# Patient Record
Sex: MALE | Race: WHITE | NOT HISPANIC OR LATINO | Employment: OTHER | URBAN - METROPOLITAN AREA
[De-identification: names, ages, dates, MRNs, and addresses within clinical notes are randomized per-mention and may not be internally consistent; named-entity substitution may affect disease eponyms.]

---

## 2017-08-07 ENCOUNTER — HOSPITAL ENCOUNTER (OUTPATIENT)
Dept: FAMILY MEDICINE CLINIC | Facility: CLINIC | Age: 67
Setting detail: SPECIMEN
Discharge: HOME | End: 2017-08-07
Attending: FAMILY MEDICINE | Admitting: FAMILY MEDICINE

## 2017-08-07 LAB
ALBUMIN SERPL-MCNC: 4.2 G/DL (ref 3.5–4.8)
ALBUMIN/GLOB SERPL: 1.4 {RATIO} (ref 1–1.7)
ALP SERPL-CCNC: 87 IU/L (ref 32–91)
ALT SERPL-CCNC: 24 IU/L (ref 17–63)
ANION GAP SERPL CALC-SCNC: 14.7 MMOL/L (ref 10–20)
AST SERPL-CCNC: 22 IU/L (ref 15–41)
BASOPHILS # BLD AUTO: 0 10*3/UL (ref 0–0.2)
BASOPHILS NFR BLD AUTO: 0 % (ref 0–2)
BILIRUB SERPL-MCNC: 0.6 MG/DL (ref 0.3–1.2)
BUN SERPL-MCNC: 12 MG/DL (ref 8–20)
BUN/CREAT SERPL: 10.9 (ref 6.2–20.3)
CALCIUM SERPL-MCNC: 9.4 MG/DL (ref 8.9–10.3)
CHLORIDE SERPL-SCNC: 107 MMOL/L (ref 101–111)
CHOLEST SERPL-MCNC: 149 MG/DL
CHOLEST/HDLC SERPL: 4 {RATIO}
CONV CO2: 25 MMOL/L (ref 22–32)
CONV LDL CHOLESTEROL DIRECT: 70 MG/DL (ref 0–100)
CONV TOTAL PROTEIN: 7.3 G/DL (ref 6.1–7.9)
CREAT UR-MCNC: 1.1 MG/DL (ref 0.7–1.2)
DIFFERENTIAL METHOD BLD: (no result)
EOSINOPHIL # BLD AUTO: 0.1 10*3/UL (ref 0–0.3)
EOSINOPHIL # BLD AUTO: 2 % (ref 0–3)
ERYTHROCYTE [DISTWIDTH] IN BLOOD BY AUTOMATED COUNT: 13 % (ref 11.5–14.5)
GLOBULIN UR ELPH-MCNC: 3.1 G/DL (ref 2.5–3.8)
GLUCOSE SERPL-MCNC: 105 MG/DL (ref 65–99)
HCT VFR BLD AUTO: 50.5 % (ref 40–54)
HDLC SERPL-MCNC: 37 MG/DL
HGB BLD-MCNC: 17.4 G/DL (ref 14–18)
LDLC/HDLC SERPL: 1.9 {RATIO}
LIPID INTERPRETATION: ABNORMAL
LYMPHOCYTES # BLD AUTO: 1.8 10*3/UL (ref 0.8–4.8)
LYMPHOCYTES NFR BLD AUTO: 30 % (ref 18–42)
MCH RBC QN AUTO: 31.3 PG (ref 26–32)
MCHC RBC AUTO-ENTMCNC: 34.5 G/DL (ref 32–36)
MCV RBC AUTO: 90.6 FL (ref 80–94)
MONOCYTES # BLD AUTO: 0.8 10*3/UL (ref 0.1–1.3)
MONOCYTES NFR BLD AUTO: 13 % (ref 2–11)
NEUTROPHILS # BLD AUTO: 3.2 10*3/UL (ref 2.3–8.6)
NEUTROPHILS NFR BLD AUTO: 55 % (ref 50–75)
NRBC BLD AUTO-RTO: 0 /100{WBCS}
NRBC/RBC NFR BLD MANUAL: 0 10*3/UL
PLATELET # BLD AUTO: 169 10*3/UL (ref 150–450)
PMV BLD AUTO: 10 FL (ref 7.4–10.4)
POTASSIUM SERPL-SCNC: 4.7 MMOL/L (ref 3.6–5.1)
RBC # BLD AUTO: 5.57 10*6/UL (ref 4.6–6)
SODIUM SERPL-SCNC: 142 MMOL/L (ref 136–144)
TRIGL SERPL-MCNC: 260 MG/DL
VLDLC SERPL CALC-MCNC: 42.2 MG/DL
WBC # BLD AUTO: 5.9 10*3/UL (ref 4.5–11.5)

## 2019-08-27 ENCOUNTER — OFFICE VISIT (OUTPATIENT)
Dept: CARDIOLOGY | Facility: CLINIC | Age: 69
End: 2019-08-27

## 2019-08-27 ENCOUNTER — CLINICAL SUPPORT NO REQUIREMENTS (OUTPATIENT)
Dept: CARDIOLOGY | Facility: CLINIC | Age: 69
End: 2019-08-27

## 2019-08-27 VITALS
HEART RATE: 68 BPM | DIASTOLIC BLOOD PRESSURE: 84 MMHG | HEIGHT: 68 IN | WEIGHT: 219 LBS | SYSTOLIC BLOOD PRESSURE: 145 MMHG | BODY MASS INDEX: 33.19 KG/M2

## 2019-08-27 DIAGNOSIS — I49.5 SICK SINUS SYNDROME (HCC): ICD-10-CM

## 2019-08-27 DIAGNOSIS — I25.10 CORONARY ARTERY DISEASE INVOLVING NATIVE CORONARY ARTERY OF NATIVE HEART WITHOUT ANGINA PECTORIS: Primary | ICD-10-CM

## 2019-08-27 DIAGNOSIS — I10 ESSENTIAL HYPERTENSION: ICD-10-CM

## 2019-08-27 DIAGNOSIS — Z95.0 PRESENCE OF CARDIAC PACEMAKER: ICD-10-CM

## 2019-08-27 PROBLEM — I73.9 PERIPHERAL VASCULAR DISEASE: Status: ACTIVE | Noted: 2019-08-27

## 2019-08-27 PROBLEM — E78.5 HYPERLIPIDEMIA: Status: ACTIVE | Noted: 2019-08-27

## 2019-08-27 PROCEDURE — 93279 PRGRMG DEV EVAL PM/LDLS PM: CPT | Performed by: NURSE PRACTITIONER

## 2019-08-27 PROCEDURE — 99213 OFFICE O/P EST LOW 20 MIN: CPT | Performed by: NURSE PRACTITIONER

## 2019-08-27 PROCEDURE — 93000 ELECTROCARDIOGRAM COMPLETE: CPT | Performed by: NURSE PRACTITIONER

## 2019-08-27 RX ORDER — ATORVASTATIN CALCIUM 40 MG/1
1 TABLET, FILM COATED ORAL DAILY
COMMUNITY
Start: 2019-06-04 | End: 2020-05-04

## 2019-08-27 RX ORDER — ATENOLOL 25 MG/1
1 TABLET ORAL DAILY
COMMUNITY
Start: 2019-06-04 | End: 2020-05-04

## 2019-08-27 RX ORDER — AMLODIPINE BESYLATE 5 MG/1
1 TABLET ORAL DAILY
COMMUNITY
Start: 2019-06-04 | End: 2020-05-04

## 2019-08-27 RX ORDER — LISINOPRIL 40 MG/1
1 TABLET ORAL DAILY
COMMUNITY
Start: 2019-06-04 | End: 2019-09-03 | Stop reason: SDUPTHER

## 2019-08-27 RX ORDER — CLOPIDOGREL BISULFATE 75 MG/1
1 TABLET ORAL DAILY
COMMUNITY
Start: 2019-06-04 | End: 2019-09-03 | Stop reason: SDUPTHER

## 2019-08-28 PROBLEM — Z95.0 PRESENCE OF CARDIAC PACEMAKER: Status: ACTIVE | Noted: 2019-08-28

## 2019-08-28 PROBLEM — I10 ESSENTIAL HYPERTENSION: Status: ACTIVE | Noted: 2019-08-28

## 2019-08-28 NOTE — ASSESSMENT & PLAN NOTE
Hypertension is unchanged.  Continue current treatment regimen.  Regular aerobic exercise.  Continue current medications.  Blood pressure will be reassessed at the next regular appointment.

## 2019-08-28 NOTE — PROGRESS NOTES
Cardiology Office Follow Up Visit      Primary Care Provider:  Byron Salazar MD    Reason for f/u:     Sick sinus syndrome  Coronary artery disease  Hypertension  Pacemaker in place      Subjective      Denies chest pain or dyspnea    CC: Follow-up regarding coronary artery disease    History of Present Illness       Aashish Freeman is a 68 y.o. male.  He has a history of advanced ischemic coronary artery disease with previous bypass surgery in 1997.  In July 2018 he had PCI to the LAD.  He was also noted at that time to have occluded OM branch of the left circumflex and occluded RCA.  His vein graft to the RCA was patent.  LAKE was no longer anastomosed to the LAD.  He had PCI to the LAD at that time.       He has a previous history of intermittent AV block with a dual-chamber pacemaker [employer] implanted 2012 he is previously had issues with atrial lead malfunction of the device in a program single-chamber mode with back-up pacing at VVI 55.    At this time he denies chest pain, dyspnea, PND, orthopnea, palpitations, near syncope, lower extreme edema or feelings of his heart racing.  He is been compliant with medical therapy        Past Medical History:   Diagnosis Date   • AK (actinic keratosis)     multiple   • Arthritis    • CAD (coronary artery disease)    • Carotid artery stenosis    • Hyperlipemia    • Hypertension    • Non-ST elevation myocardial infarction (NSTEMI) (CMS/[facility])     2015   • Peripheral vascular disease (CMS/[facility])    • Pre-diabetes    • Pulmonary nodule 2016    left lung   • SSS (sick sinus syndrome) (CMS/[facility])     intermittent AV block       Past Surgical History:   Procedure Laterality Date   • CAROTID ENDARTERECTOMY Right 10/2014   • CORONARY ANGIOPLASTY  2012    svg to obtuse marginal branch of lcx   • CORONARY ANGIOPLASTY WITH STENT PLACEMENT  07/29/2018    pci to mid lad   • CORONARY ARTERY BYPASS GRAFT  1997    5 vessels at [facility]/ Dr Armendariz   • CORONARY STENT PLACEMENT   03/16/2015    stenting of svg graft to obtuse marginal and peripheral descending artery   • FEMORAL POPLITEAL BYPASS  11/2014   • ILIAC ARTERY STENT  12/2014   • PACEMAKER IMPLANTATION  03/29/2012    [employer]         Current Outpatient Medications:   •  amLODIPine (NORVASC) 5 MG tablet, 1 tablet Daily., Disp: , Rfl:   •  aspirin 81 MG tablet, 1 tablet Daily., Disp: , Rfl:   •  atenolol (TENORMIN) 25 MG tablet, 1 tablet Daily., Disp: , Rfl:   •  atorvastatin (LIPITOR) 40 MG tablet, 1 tablet Daily., Disp: , Rfl:   •  clopidogrel (PLAVIX) 75 MG tablet, 1 tablet Daily., Disp: , Rfl:   •  lisinopril (PRINIVIL,ZESTRIL) 40 MG tablet, 1 tablet Daily., Disp: , Rfl:     Social History     Socioeconomic History   • Marital status:      Spouse name: Not on file   • Number of children: Not on file   • Years of education: Not on file   • Highest education level: Not on file   Tobacco Use   • Smoking status: Current Every Day Smoker     Packs/day: 0.50     Types: Cigarettes   Substance and Sexual Activity   • Alcohol use: No     Frequency: Never   • Drug use: No   • Sexual activity: Defer       Family History   Problem Relation Age of Onset   • Heart disease Father        The following portions of the patient's history were reviewed and updated as appropriate: allergies, current medications, past family history, past medical history, past social history, past surgical history and problem list.    Review of Systems   Constitution: Negative for decreased appetite, diaphoresis and weakness.   HENT: Negative for congestion, hearing loss and nosebleeds.    Cardiovascular: Negative for chest pain, claudication, dyspnea on exertion, irregular heartbeat, leg swelling, near-syncope, orthopnea, palpitations, paroxysmal nocturnal dyspnea and syncope.   Respiratory: Negative for cough, shortness of breath and sleep disturbances due to breathing.    Endocrine: Negative for polyuria.   Hematologic/Lymphatic: Does not  "bruise/bleed easily.   Skin: Negative for itching and rash.   Musculoskeletal: Negative for back pain, muscle weakness and myalgias.   Gastrointestinal: Negative for abdominal pain, change in bowel habit and nausea.   Genitourinary: Negative for dysuria, flank pain, frequency and hesitancy.   Neurological: Negative for dizziness and tremors.   Psychiatric/Behavioral: Negative for altered mental status. The patient does not have insomnia.      /84   Pulse 68   Ht 172.7 cm (68\")   Wt 99.3 kg (219 lb)   BMI 33.30 kg/m² .  Objective     Physical Exam   Constitutional: He is oriented to person, place, and time. He appears well-developed and well-nourished. No distress.   HENT:   Head: Normocephalic and atraumatic.   Eyes: Pupils are equal, round, and reactive to light.   Neck: Normal range of motion. Neck supple. No JVD present.   Cardiovascular: Normal rate, regular rhythm, S1 normal, S2 normal, normal heart sounds and intact distal pulses.   No murmur heard.  Pulmonary/Chest: Effort normal and breath sounds normal.   Abdominal: Soft. Normal appearance. He exhibits no distension. There is no tenderness.   Musculoskeletal: Normal range of motion. He exhibits no edema.   Neurological: He is alert and oriented to person, place, and time.   Skin: Skin is warm and dry.   Psychiatric: He has a normal mood and affect.           ECG 12 Lead  Date/Time: 8/27/2019 3:23 PM  Performed by: Radha Calvo APRN  Authorized by: Radha Calvo APRN   Comparison: compared with previous ECG from 8/8/2018  Similar to previous ECG  Rhythm: sinus rhythm  Rate: normal  BPM: 68  Conduction: right bundle branch block, left posterior fascicular block and 1st degree AV block  Q waves: II, III and aVF      Clinical impression: abnormal EKG                In Office Device Interrogation:     DEVICE INTERROGATION:  IN OFFICE    DEVICE TYPE:   dualChamber pacemaker    :   [employer]    BATTERY:  Stable    TIME TO " ELECTIVE REPLACEMENT INDICATORS:   3.5 years    CHARGE TIME:   Not applicable        LEAD DATA:    Atrial:   Dual-chamber device programmed VVI    Ventricular:     11.2 mV, 580 ohms, 0.7 V@0.4 ms    LV:      Atrial pacing percentage: Not applicable  Ventricular pacing percentage: 4% %      Arrhythmia Logbook Reviewed: No ventricular high rate episodes      Summary:    Stable Device Function  Known atrial lead malfunction  No significant arhythmia burden.     Battery status is stable.      NEXT IN OFFICE DEVICE CHECK DUE: 6 months  REMOTE DEVICE INTERROGATIONS: Not applicable      Diagnoses and all orders for this visit:    1. Coronary artery disease involving native coronary artery of native heart without angina pectoris (Primary)  Assessment & Plan:  Coronary artery disease is unchanged Stable with no s/s of unstable angina.  Continue current treatment regimen.  Regular aerobic exercise.  Continue current medications.  Cardiac status will be reassessed in 6 months.      2. Sick sinus syndrome (CMS/HCC)  Assessment & Plan:  Stable since pacemaker implant      3. Essential hypertension  Assessment & Plan:  Hypertension is unchanged.  Continue current treatment regimen.  Regular aerobic exercise.  Continue current medications.  Blood pressure will be reassessed at the next regular appointment.      4. Presence of cardiac pacemaker  Assessment & Plan:  Stable device function battery 3.5 years of longevity  Atrial lead previously programmed off due to atrial lead dysfunction      Other orders  -     ECG 12 Lead      Plan:  Continue current medications as prescribed  Return to office in 6 months for follow-up and device interrogation  Advised to contact office if any new or worsening problems develop            Next follow-up appointment:  6 months

## 2019-08-28 NOTE — ASSESSMENT & PLAN NOTE
Stable device function battery 3.5 years of longevity  Atrial lead previously programmed off due to atrial lead dysfunction

## 2019-08-28 NOTE — ASSESSMENT & PLAN NOTE
Coronary artery disease is unchanged Stable with no s/s of unstable angina.  Continue current treatment regimen.  Regular aerobic exercise.  Continue current medications.  Cardiac status will be reassessed in 6 months.

## 2019-09-04 RX ORDER — CLOPIDOGREL BISULFATE 75 MG/1
TABLET ORAL
Qty: 90 TABLET | Refills: 2 | Status: SHIPPED | OUTPATIENT
Start: 2019-09-04 | End: 2020-05-04

## 2019-09-04 RX ORDER — LISINOPRIL 40 MG/1
TABLET ORAL
Qty: 90 TABLET | Refills: 3 | Status: SHIPPED | OUTPATIENT
Start: 2019-09-04 | End: 2020-08-24

## 2020-01-05 ENCOUNTER — HOSPITAL ENCOUNTER (EMERGENCY)
Facility: HOSPITAL | Age: 70
Discharge: HOME | End: 2020-01-05
Admitting: EMERGENCY MEDICINE

## 2020-01-05 ENCOUNTER — APPOINTMENT (OUTPATIENT)
Dept: GENERAL RADIOLOGY | Facility: HOSPITAL | Age: 70
End: 2020-01-05

## 2020-01-05 VITALS
DIASTOLIC BLOOD PRESSURE: 77 MMHG | SYSTOLIC BLOOD PRESSURE: 155 MMHG | TEMPERATURE: 98.2 F | OXYGEN SATURATION: 100 % | WEIGHT: 210.32 LBS | BODY MASS INDEX: 31.88 KG/M2 | RESPIRATION RATE: 17 BRPM | HEIGHT: 68 IN | HEART RATE: 68 BPM

## 2020-01-05 DIAGNOSIS — M25.562 ACUTE PAIN OF LEFT KNEE: Primary | ICD-10-CM

## 2020-01-05 DIAGNOSIS — S80.212A ABRASION OF LEFT KNEE, INITIAL ENCOUNTER: ICD-10-CM

## 2020-01-05 PROCEDURE — 25010000002 TDAP 5-2.5-18.5 LF-MCG/0.5 SUSPENSION: Performed by: NURSE PRACTITIONER

## 2020-01-05 PROCEDURE — 73564 X-RAY EXAM KNEE 4 OR MORE: CPT

## 2020-01-05 PROCEDURE — 90471 IMMUNIZATION ADMIN: CPT | Performed by: NURSE PRACTITIONER

## 2020-01-05 PROCEDURE — 90715 TDAP VACCINE 7 YRS/> IM: CPT | Performed by: NURSE PRACTITIONER

## 2020-01-05 PROCEDURE — 99283 EMERGENCY DEPT VISIT LOW MDM: CPT

## 2020-01-05 RX ORDER — DIAPER,BRIEF,INFANT-TODD,DISP
EACH MISCELLANEOUS EVERY 12 HOURS SCHEDULED
Status: DISCONTINUED | OUTPATIENT
Start: 2020-01-05 | End: 2020-01-05 | Stop reason: HOSPADM

## 2020-01-05 RX ADMIN — TETANUS TOXOID, REDUCED DIPHTHERIA TOXOID AND ACELLULAR PERTUSSIS VACCINE, ADSORBED 0.5 ML: 5; 2.5; 8; 8; 2.5 SUSPENSION INTRAMUSCULAR at 19:44

## 2020-01-05 RX ADMIN — BACITRACIN: 500 OINTMENT TOPICAL at 19:45

## 2020-01-06 NOTE — ED PROVIDER NOTES
Subjective   Patient is a 69-year-old white male comes in with complaints of left knee pain states about 1015 was going down his ramp which was wooden and there was frost at the bottom slipped no loss of consciousness no head hit of head landed on his left knee tried to tough it out and increased pain tetanus is unknown.  Did not take any ibuprofen or Tylenol did go to [facility] and throughout the day it has just gotten a little worse so came and wanted to make sure he did not break anything offered pain medication Tylenol or ibuprofen states that have this at home does not need anything at the present time      History provided by:  Patient  Knee Pain   Location:  Knee  Injury: yes    Mechanism of injury: fall    Fall:     Fall occurred:  Walking    Impact surface:  Hard floor    Point of impact:  Knees    Entrapped after fall: no    Knee location:  L knee  Pain details:     Quality:  Aching    Radiates to:  Does not radiate    Severity:  Mild    Onset quality:  Sudden    Progression:  Worsening  Chronicity:  New  Tetanus status:  Unknown  Prior injury to area:  No  Relieved by:  None tried  Worsened by:  Flexion and extension  Ineffective treatments:  None tried  Associated symptoms: no back pain, no decreased ROM, no fatigue, no fever, no muscle weakness, no neck pain, no numbness, no stiffness, no swelling and no tingling        Review of Systems   Constitutional: Negative for activity change, appetite change, fatigue and fever.   HENT: Negative for congestion and trouble swallowing.    Eyes: Negative for discharge and redness.   Respiratory: Negative for apnea, cough, chest tightness, shortness of breath and stridor.    Cardiovascular: Negative for chest pain, palpitations and leg swelling.   Gastrointestinal: Negative for abdominal distention, abdominal pain and nausea.   Musculoskeletal: Positive for arthralgias. Negative for back pain, gait problem, joint swelling, neck pain and stiffness.   Skin: Negative for  color change, pallor and rash.   Neurological: Negative for dizziness, syncope, weakness, light-headedness and numbness.       Past Medical History:   Diagnosis Date   • AK (actinic keratosis)     multiple   • Arthritis    • CAD (coronary artery disease)    • Carotid artery stenosis    • Hyperlipemia    • Hypertension    • Non-ST elevation myocardial infarction (NSTEMI) (CMS/[facility])     2015   • Peripheral vascular disease (CMS/[facility])    • Pre-diabetes    • Pulmonary nodule 2016    left lung   • SSS (sick sinus syndrome) (CMS/[facility])     intermittent AV block       No Known Allergies    Past Surgical History:   Procedure Laterality Date   • CAROTID ENDARTERECTOMY Right 10/2014   • CORONARY ANGIOPLASTY  2012    svg to obtuse marginal branch of lcx   • CORONARY ANGIOPLASTY WITH STENT PLACEMENT  07/29/2018    pci to mid lad   • CORONARY ARTERY BYPASS GRAFT  1997    5 vessels at [facility]/ Dr Armendariz   • CORONARY STENT PLACEMENT  03/16/2015    stenting of svg graft to obtuse marginal and peripheral descending artery   • FEMORAL POPLITEAL BYPASS  11/2014   • ILIAC ARTERY STENT  12/2014   • PACEMAKER IMPLANTATION  03/29/2012    [facility]       Family History   Problem Relation Age of Onset   • Heart disease Father        Social History     Socioeconomic History   • Marital status:      Spouse name: Not on file   • Number of children: Not on file   • Years of education: Not on file   • Highest education level: Not on file   Tobacco Use   • Smoking status: Current Every Day Smoker     Packs/day: 0.50     Types: Cigarettes   Substance and Sexual Activity   • Alcohol use: No     Frequency: Never   • Drug use: No   • Sexual activity: Defer           Objective   Physical Exam   Constitutional: He is oriented to person, place, and time. He appears well-developed and well-nourished. No distress.   HENT:   Head: Normocephalic and atraumatic.   Left Ear: External ear normal.   Mouth/Throat: Oropharynx is clear and moist.    Eyes: Pupils are equal, round, and reactive to light. Conjunctivae and EOM are normal.   Neck: Normal range of motion. Neck supple.   Cardiovascular: Normal rate, regular rhythm, normal heart sounds and intact distal pulses. Exam reveals no gallop and no friction rub.   No murmur heard.  Pulmonary/Chest: Effort normal and breath sounds normal. No respiratory distress. He exhibits no tenderness.   Abdominal: Soft. Bowel sounds are normal. He exhibits no distension.   Musculoskeletal: Normal range of motion. He exhibits no edema, tenderness or deformity.   Neurological: He is alert and oriented to person, place, and time. No sensory deficit.   Skin: Skin is warm and dry. Abrasion noted. He is not diaphoretic.        Psychiatric: He has a normal mood and affect. His behavior is normal. Judgment and thought content normal.   Nursing note and vitals reviewed.      Procedures           ED Course          Xr Knee 4+ View Left    Result Date: 1/5/2020  No acute fracture or dislocation.  Electronically Signed By-Ramesh Ruiz On:1/5/2020 7:40 PM This report was finalized on [id number] by  Ramesh Ruiz, .    Medications   bacitracin ointment ( Topical Given 1/5/20 1945)   Tdap (BOOSTRIX) injection 0.5 mL (0.5 mL Intramuscular Given 1/5/20 1944)     Labs Reviewed - No data to display                                        MDM  Number of Diagnoses or Management Options  Abrasion of left knee, initial encounter: minor  Acute pain of left knee: minor  Diagnosis management comments: Disposition: Discharged.    Patient discharged in stable condition.    Reviewed implications of results, diagnosis, meds, responsibility to follow up, warning signs and symptoms of possible worsening, potential complications and reasons to return to ER increased pain chest pain shortness of breath fever chills or signs of infection    Patient/Family voiced understanding of above instructions.    Discussed plan for discharge, as there is no emergent  indication for admission.  Pt/family is agreeable and understands need for follow up and repeat testing.  Pt is aware that discharge does not mean that nothing is wrong but it indicates no emergency is present and they must continue care with follow-up as given below or physician of their choice.     FOLLOW-UP  Byron Salazar  [facility] [address] [phone] an appointment as soon as possible for a visit in 2 daysIf symptoms worsen  [facility] EMERGENCY [id number] [place] [address] [phone] symptoms worsen       Medication List      No changes were made to your prescriptions during this visit.            Amount and/or Complexity of Data Reviewed  Tests in the radiology section of CPT®: reviewed        Final diagnoses:   Acute pain of left knee   Abrasion of left knee, initial encounter            Elise Timmons, APRN  01/05/20 1954

## 2020-01-10 ENCOUNTER — OFFICE VISIT (OUTPATIENT)
Dept: FAMILY MEDICINE CLINIC | Facility: CLINIC | Age: 70
End: 2020-01-10

## 2020-01-10 VITALS
RESPIRATION RATE: 12 BRPM | WEIGHT: 217 LBS | HEART RATE: 83 BPM | BODY MASS INDEX: 32.99 KG/M2 | SYSTOLIC BLOOD PRESSURE: 151 MMHG | DIASTOLIC BLOOD PRESSURE: 82 MMHG

## 2020-01-10 DIAGNOSIS — Z72.0 TOBACCO USE: ICD-10-CM

## 2020-01-10 DIAGNOSIS — S83.92XA SPRAIN OF LEFT KNEE, UNSPECIFIED LIGAMENT, INITIAL ENCOUNTER: Primary | ICD-10-CM

## 2020-01-10 PROCEDURE — 99213 OFFICE O/P EST LOW 20 MIN: CPT | Performed by: FAMILY MEDICINE

## 2020-01-10 NOTE — PROGRESS NOTES
Rooming Tab(CC,VS,Pt Hx,Fall Screen)  Chief Complaint   Patient presents with   • Knee Pain       Subjective 69-year-old here complaining of knee pain that started Sunday after he slipped on his ramp and fell onto his left knee, there was no twisting motion to it.  The pain beak was quite severe but actually has improved over the last 2 days.  The pain was so severe however that day that he went to the emergency room and had a negative x-ray.  It is not swelling on him.  Its not giving out on him.  It does not lock up on him.  He feels like maybe it is getting better.  The patient has hypertension and has not been checking his blood pressure at home.  He denies any chest pain dizziness or syncope.    I have reviewed and updated his medications, medical history and problem list during today's office visit.     Patient Care Team:  Byron Salazar MD as PCP - General  Hermilo Traylor MD as PCP - Claims Attributed    Problem List Tab  Medications Tab  Synopsis Tab  Chart Review Tab  Care Everywhere Tab  Immunizations Tab  Patient History Tab    Social History     Tobacco Use   • Smoking status: Current Every Day Smoker     Packs/day: 0.50     Types: Cigarettes   • Smokeless tobacco: Never Used   • Tobacco comment: Stop smoking   Substance Use Topics   • Alcohol use: No     Frequency: Never       Review of Systems   Constitutional: Negative for chills, fatigue and fever.   HENT: Negative for nosebleeds.    Eyes: Negative for double vision.   Respiratory: Negative for chest tightness and shortness of breath.    Cardiovascular: Negative for chest pain and palpitations.   Gastrointestinal: Negative for blood in stool.   Genitourinary: Negative for dysuria and hematuria.   Musculoskeletal:        Left knee pain improving   Neurological: Negative for dizziness and syncope.   Psychiatric/Behavioral: Negative for depressed mood.       Objective     Rooming Tab(CC,VS,Pt Hx,Fall Screen)  /82 (BP Location: Right arm,  Patient Position: Sitting, Cuff Size: Large Adult)   Pulse 83   Resp 12   Wt 98.4 kg (217 lb)   BMI 32.99 kg/m²     Body mass index is 32.99 kg/m².    Physical Exam   Constitutional: He is oriented to person, place, and time. He appears well-developed and well-nourished. No distress.   HENT:   Head: Normocephalic and atraumatic.   Nose: Nose normal.   Mouth/Throat: Oropharynx is clear and moist.   Eyes: Pupils are equal, round, and reactive to light. Conjunctivae, EOM and lids are normal.   Neck: Trachea normal and normal range of motion. Neck supple. No JVD present. Carotid bruit is not present. No thyroid mass and no thyromegaly present.   No carotid bruits   Cardiovascular: Normal rate, regular rhythm, normal heart sounds and intact distal pulses.   Pulmonary/Chest: Effort normal and breath sounds normal.   Musculoskeletal:   No c/c/e  Range of motion of the knee is okay, has significant crepitation.  There is no effusion.  Negative Lockman Santana anterior posterior drawer sign   Neurological: He is alert and oriented to person, place, and time. No cranial nerve deficit.   Skin: Skin is warm and dry.   Psychiatric: He has a normal mood and affect. His speech is normal and behavior is normal. He is attentive.   Nursing note and vitals reviewed.       Statin Choice Calculator  Data Reviewed:    Xr Knee 4+ View Left    Result Date: 1/5/2020  Impression: No acute fracture or dislocation.  Electronically Signed By-Ramesh Ruiz On:1/5/2020 7:40 PM This report was finalized on [id number] by  Ramesh Ruiz, .                Assessment/Plan   Order Review Tab  Health Maintenance Tab  Patient Plan/Order Tab  Diagnoses and all orders for this visit:    1. Sprain of left knee, unspecified ligament, initial encounter (Primary)  Assessment & Plan:  This seems to be improving with time.  At this point I would do him further work-up.  If persistent we could inject it with a steroid.  Further evaluation treatment pending his  improvement over time      2. Tobacco use  Assessment & Plan:  Encourage the patient to stop smoking        Wrapup Tab  Return if symptoms worsen or fail to improve.

## 2020-01-13 PROBLEM — Z72.0 TOBACCO USE: Status: ACTIVE | Noted: 2020-01-13

## 2020-01-13 NOTE — ASSESSMENT & PLAN NOTE
This seems to be improving with time.  At this point I would do him further work-up.  If persistent we could inject it with a steroid.  Further evaluation treatment pending his improvement over time

## 2020-01-13 NOTE — ASSESSMENT & PLAN NOTE
Hypertension is worsening.  Continue current treatment regimen.  Dietary sodium restriction.  Weight loss.  Regular aerobic exercise.  Stop smoking.  Continue current medications.  Blood pressure will be reassessed at the next regular appointment.  Patient states that his cardiologist controls his blood pressure medications.  I recommend he check his blood pressure at home daily and follow-up with his cardiologist and bring his blood pressure machine into the doctor's office for evaluation

## 2020-01-31 PROBLEM — E78.2 MIXED HYPERLIPIDEMIA: Status: ACTIVE | Noted: 2019-08-27

## 2020-02-11 ENCOUNTER — CLINICAL SUPPORT NO REQUIREMENTS (OUTPATIENT)
Dept: CARDIOLOGY | Facility: CLINIC | Age: 70
End: 2020-02-11

## 2020-02-11 ENCOUNTER — OFFICE VISIT (OUTPATIENT)
Dept: CARDIOLOGY | Facility: CLINIC | Age: 70
End: 2020-02-11

## 2020-02-11 VITALS
HEART RATE: 67 BPM | OXYGEN SATURATION: 99 % | BODY MASS INDEX: 33.25 KG/M2 | SYSTOLIC BLOOD PRESSURE: 138 MMHG | HEIGHT: 68 IN | WEIGHT: 219.4 LBS | DIASTOLIC BLOOD PRESSURE: 74 MMHG

## 2020-02-11 DIAGNOSIS — I73.9 PERIPHERAL VASCULAR DISEASE (HCC): ICD-10-CM

## 2020-02-11 DIAGNOSIS — Z95.0 PRESENCE OF CARDIAC PACEMAKER: ICD-10-CM

## 2020-02-11 DIAGNOSIS — I10 ESSENTIAL HYPERTENSION: ICD-10-CM

## 2020-02-11 DIAGNOSIS — I65.23 BILATERAL CAROTID ARTERY STENOSIS: ICD-10-CM

## 2020-02-11 DIAGNOSIS — I49.5 SICK SINUS SYNDROME (HCC): ICD-10-CM

## 2020-02-11 DIAGNOSIS — I25.708 ATHEROSCLEROSIS OF CORONARY ARTERY BYPASS GRAFT OF NATIVE HEART WITH STABLE ANGINA PECTORIS (HCC): Primary | ICD-10-CM

## 2020-02-11 PROCEDURE — 93000 ELECTROCARDIOGRAM COMPLETE: CPT | Performed by: INTERNAL MEDICINE

## 2020-02-11 PROCEDURE — 99213 OFFICE O/P EST LOW 20 MIN: CPT | Performed by: INTERNAL MEDICINE

## 2020-02-11 NOTE — PROGRESS NOTES
Cardiology Office Visit Note      Referring physician:  Marie    Reason For Followup: 6 month/device check    HPI:  Aashish Freeman is a 69 y.o. male presents FU hx advanced ischemic coronary artery disease with previous bypass surgery in 1997.   PCI to the LAD 7/2018.  Also noted at that time to have occluded OM branch of the left circumflex and occluded RCA.    Vein graft to the RCA was patent.  LAKE was no longer anastomosed to the LAD.  He had PCI to the LAD at that time.       He has a previous history of intermittent AV block with a dual-chamber pacemaker [employer] implanted 2012 with  Previous  issues with atrial lead malfunction of the device in a program single-chamber mode with back-up pacing at VVI 55.    Continues to smoke 12 cigarettes daily.  He is functionally active and plays golf as weather permits    At this time he denies chest pain, dyspnea, PND, orthopnea, palpitations, near syncope, lower extreme edema or feelings of his heart racing.    He is been compliant with medical therapy        Past Medical History:   Diagnosis Date   • AK (actinic keratosis)     multiple   • Arthritis    • Bilateral carotid artery stenosis 4/30/2012   • CAD (coronary artery disease)    • Carotid artery stenosis    • Coronary artery disease involving native coronary artery of native heart without angina pectoris 4/30/2012    PCI to LAD 7/2018 CABG 1997    • Hyperlipemia    • Hypertension    • Mixed hyperlipidemia 8/27/2019   • Non-ST elevation myocardial infarction (NSTEMI) (CMS/[facility])     2015   • Peripheral vascular disease (CMS/[facility])    • Pre-diabetes    • Presence of cardiac pacemaker 8/28/2019    BS PM 3/2012   • Pulmonary nodule 2016    left lung   • Sick sinus syndrome (CMS/HCC) 8/27/2019   • SSS (sick sinus syndrome) (CMS/[facility])     intermittent AV block       Past Surgical History:   Procedure Laterality Date   • CAROTID ENDARTERECTOMY Right 10/2014   • CORONARY ANGIOPLASTY  2012    svg to obtuse marginal  branch of lcx   • CORONARY ANGIOPLASTY WITH STENT PLACEMENT  07/29/2018    pci to mid lad   • CORONARY ARTERY BYPASS GRAFT  1997    5 vessels at [facility]/ Dr Armendariz   • CORONARY STENT PLACEMENT  03/16/2015    stenting of svg graft to obtuse marginal and peripheral descending artery   • FEMORAL POPLITEAL BYPASS  11/2014   • ILIAC ARTERY STENT  12/2014   • PACEMAKER IMPLANTATION  03/29/2012    Anpro21         Current Outpatient Medications:   •  amLODIPine (NORVASC) 5 MG tablet, 1 tablet Daily., Disp: , Rfl:   •  aspirin 81 MG tablet, 1 tablet Daily., Disp: , Rfl:   •  atenolol (TENORMIN) 25 MG tablet, 1 tablet Daily., Disp: , Rfl:   •  atorvastatin (LIPITOR) 40 MG tablet, 1 tablet Daily., Disp: , Rfl:   •  clopidogrel (PLAVIX) 75 MG tablet, TAKE 1 TABLET BY MOUTH  DAILY, Disp: 90 tablet, Rfl: 2  •  lisinopril (PRINIVIL,ZESTRIL) 40 MG tablet, TAKE 1 TABLET BY MOUTH  DAILY, Disp: 90 tablet, Rfl: 3    Social History     Socioeconomic History   • Marital status:      Spouse name: Not on file   • Number of children: Not on file   • Years of education: Not on file   • Highest education level: Not on file   Tobacco Use   • Smoking status: Current Every Day Smoker     Packs/day: 0.50     Types: Cigarettes   • Smokeless tobacco: Never Used   • Tobacco comment: Stop smoking   Substance and Sexual Activity   • Alcohol use: No     Frequency: Never   • Drug use: No   • Sexual activity: Defer       Family History   Problem Relation Age of Onset   • Heart disease Father          Review of Systems   General: denies fever, chills, anorexia, weight loss  Eyes: denies blurring, diplopia  Ear/Nose/Throat: denies ear pain, nosebleeds, hoarseness  Cardiovascular: See HPI  Respiratory: denies excessive sputum, hemoptysis, wheezing  Gastrointestinal: denies nausea, vomiting, change in bowel habits, abdominal pain  Genitourinary: denies dysuria and hematuria  Musculoskeletal: denies back pain, joint pain, joint swelling,  "muscle cramps, weakness  Skin: denies rashes, itching, suspicious lesions  Neurologic: denies focal neuro deficits  Psychiatric: denies depression, anxiety  Endocrine: denies cold intolerance, heat intolerance  Hematologic/Lymphatic: denies abnormal bruising, bleeding  Allergic/Immunologic: denies urticaria or persistent infections      Objective     Visit Vitals  /74   Pulse 67   Ht 172.7 cm (68\")   Wt 99.5 kg (219 lb 6.4 oz)   SpO2 99% Comment: room air   BMI 33.36 kg/m²           Physical Exam  General:     Mildly Obese, well developed,, in no acute distress.    Head:     normocephalic and atraumatic.    Eyes:    PERRL/EOM intact, conjunctiva and sclera clear with out nystagmus.    Neck:    no jvd or bruits  Chest Wall:    no deformities   Lungs:    clear bilaterally to auscultation with adequate global airflow   Heart:    non-displaced PMI; regular rate and rhythm, normal S1, S2 without murmurs, rubs, or gallops  Abdomen:  Soft, nontender without HSM  Msk:    no deformity; adequate R OM  Pulses:   Definitely decreased pulses both lower extremities.    Extremities:    no clubbing, cyanosis, edema, no foot ulceration  Neurologic:    no focal sensory or motor deficits  Skin:    intact without lesions or rashes.    Psych:    alert and cooperative; normal mood and affect; normal attention span and concentration.            ECG 12 Lead  Date/Time: 2/11/2020 2:21 PM  Performed by: DIEUDONNE Carrera MD  Authorized by: DIEUDONNE Carrera MD   Comparison: compared with previous ECG from 8/27/2019  Similar to previous ECG  Comments: Abnormal EKG  SR with first degree AVB  HR 67 bpm  RBBB              Assessment:   1. Atherosclerosis of coronary artery bypass graft of native heart with stable angina pectoris (CMS/HCC): Status post remote CABG with subsequent PCI to OM branch of left circumflex and LAD 2018  -Currently hemodynamically stable well compensated and angina free    2. Bilateral carotid artery " stenosis  -Remains asymptomatic; previous carotid endarterectomy.  Followed by vascular surgery service routinely    3. Essential hypertension  -Remains well-regulated on current medications listed and reviewed    4. Peripheral vascular disease (CMS/HCC)  Currently denies claudication symptoms, or any focal or lateralizing sensorimotor deficits.    5. Presence of cardiac pacemaker  Satisfactory device site and functioning with good battery status remaining        Plan:  New current medication as listed reviewed detail.  All advised continued weaning of tobacco use with goal of total cessation of all tobacco products in view of his advanced peripheral vascular and coronary atherosclerotic disease.  Cardiac rate reduction regular progressive exercise with ongoing risk factor modification.  Return to clinic 6 months or sooner if needed    DIEUDONNE Carrera MD  2/28/2020 12:27 PM    This report was generated using the Dragon voice recognition system.

## 2020-02-17 PROCEDURE — 93279 PRGRMG DEV EVAL PM/LDLS PM: CPT | Performed by: NURSE PRACTITIONER

## 2020-02-17 NOTE — PROGRESS NOTES
DEVICE INTERROGATION:  IN OFFICE    DEVICE TYPE:   Dual-chamber pacemaker program single-chamber mode    :   [employer]    BATTERY:  Stable    TIME TO ELECTIVE REPLACEMENT INDICATORS:   3.5 years    CHARGE TIME:   Not applicable        LEAD DATA:        Ventricular:     12 mV, 550 ohms, 0.5 V@0.4 ms    LV:      Atrial pacing percentage: 0 %    Ventricular pacing percentage: 3%       Arrhythmia Logbook Reviewed: No ventricular high rate episodes        Summary:    Stable Device Function atrial lead programmed off due to elevated atrial lead impedance and poor sensing.  No significant arhythmia burden.     Battery status is stable.      NEXT IN OFFICE DEVICE CHECK DUE: 6 months    REMOTE DEVICE INTERROGATIONS: Not applicable

## 2020-05-04 RX ORDER — ATORVASTATIN CALCIUM 40 MG/1
TABLET, FILM COATED ORAL
Qty: 90 TABLET | Refills: 2 | Status: SHIPPED | OUTPATIENT
Start: 2020-05-04 | End: 2021-01-24

## 2020-05-04 RX ORDER — ATENOLOL 25 MG/1
TABLET ORAL
Qty: 90 TABLET | Refills: 2 | Status: SHIPPED | OUTPATIENT
Start: 2020-05-04 | End: 2020-11-23 | Stop reason: SDUPTHER

## 2020-05-04 RX ORDER — CLOPIDOGREL BISULFATE 75 MG/1
TABLET ORAL
Qty: 90 TABLET | Refills: 2 | Status: SHIPPED | OUTPATIENT
Start: 2020-05-04 | End: 2021-01-24

## 2020-05-04 RX ORDER — AMLODIPINE BESYLATE 5 MG/1
TABLET ORAL
Qty: 90 TABLET | Refills: 2 | Status: SHIPPED | OUTPATIENT
Start: 2020-05-04 | End: 2021-01-24

## 2020-08-11 ENCOUNTER — OFFICE VISIT (OUTPATIENT)
Dept: CARDIOLOGY | Facility: CLINIC | Age: 70
End: 2020-08-11

## 2020-08-11 ENCOUNTER — CLINICAL SUPPORT NO REQUIREMENTS (OUTPATIENT)
Dept: CARDIOLOGY | Facility: CLINIC | Age: 70
End: 2020-08-11

## 2020-08-11 VITALS
DIASTOLIC BLOOD PRESSURE: 68 MMHG | HEIGHT: 68 IN | BODY MASS INDEX: 32.13 KG/M2 | HEART RATE: 64 BPM | OXYGEN SATURATION: 100 % | WEIGHT: 212 LBS | SYSTOLIC BLOOD PRESSURE: 120 MMHG

## 2020-08-11 DIAGNOSIS — Z95.0 PRESENCE OF CARDIAC PACEMAKER: ICD-10-CM

## 2020-08-11 DIAGNOSIS — I10 ESSENTIAL HYPERTENSION: ICD-10-CM

## 2020-08-11 DIAGNOSIS — I49.5 SICK SINUS SYNDROME (HCC): Primary | ICD-10-CM

## 2020-08-11 DIAGNOSIS — E78.2 MIXED HYPERLIPIDEMIA: ICD-10-CM

## 2020-08-11 PROCEDURE — 99214 OFFICE O/P EST MOD 30 MIN: CPT | Performed by: INTERNAL MEDICINE

## 2020-08-11 PROCEDURE — 93000 ELECTROCARDIOGRAM COMPLETE: CPT | Performed by: INTERNAL MEDICINE

## 2020-08-11 PROCEDURE — 93279 PRGRMG DEV EVAL PM/LDLS PM: CPT | Performed by: NURSE PRACTITIONER

## 2020-08-11 RX ORDER — CETIRIZINE HYDROCHLORIDE 10 MG/1
10 TABLET ORAL NIGHTLY
Qty: 30 TABLET | Refills: 5 | Status: SHIPPED | OUTPATIENT
Start: 2020-08-11 | End: 2021-03-08

## 2020-08-11 RX ORDER — MONTELUKAST SODIUM 10 MG/1
10 TABLET ORAL EVERY MORNING
Qty: 30 TABLET | Refills: 5 | Status: SHIPPED | OUTPATIENT
Start: 2020-08-11 | End: 2020-08-12

## 2020-08-11 RX ORDER — BUDESONIDE AND FORMOTEROL FUMARATE DIHYDRATE 160; 4.5 UG/1; UG/1
2 AEROSOL RESPIRATORY (INHALATION)
Qty: 1 INHALER | Refills: 5 | Status: SHIPPED | OUTPATIENT
Start: 2020-08-11 | End: 2020-08-18

## 2020-08-11 NOTE — PROGRESS NOTES
Cardiology Office Visit Note      Referring physician:  Byron Salazar MD    Reason For Followup: 6 month follow up/device check    HPI:  Aashish Freeman is a 69 y.o. male presents today for a follow up visit Patient has known hx of advanced ischemic coronary artery disease with previous bypass surgery in 1997.     PCI to the LAD 7/2018,HTN,SSS and hyperlipidemia.He has a previous history of intermittent AV block with a dual-chamber pacemaker [employer] implanted 2012 with  Previousissues with atrial lead malfunction of the device in a program single-chamber mode with back-up pacing at VVI 55.    Continues to smoke 12 cigarettes daily.  He is functionally active and plays golf as weather permits and works at a local golf course part-time.    At this time he denies chest pain, dyspnea, PND, orthopnea, palpitations, near syncope, lower extreme edema or feelings of his heart racing.  His only complaints today are related to sinusitis with flare of allergic rhinitis.    He is been compliant with medical therapy    Past Medical History:   Diagnosis Date   • AK (actinic keratosis)     multiple   • Arthritis    • Bilateral carotid artery stenosis 4/30/2012   • CAD (coronary artery disease)    • Carotid artery stenosis    • Coronary artery disease involving native coronary artery of native heart without angina pectoris 4/30/2012    PCI to LAD 7/2018 CABG 1997    • Hyperlipemia    • Hypertension    • Mixed hyperlipidemia 8/27/2019   • Non-ST elevation myocardial infarction (NSTEMI) (CMS/[facility])     2015   • Peripheral vascular disease (CMS/HCC)    • Pre-diabetes    • Presence of cardiac pacemaker 8/28/2019    BS PM 3/2012   • Pulmonary nodule 2016    left lung   • Sick sinus syndrome (CMS/HCC) 8/27/2019   • SSS (sick sinus syndrome) (CMS/[facility])     intermittent AV block       Past Surgical History:   Procedure Laterality Date   • CAROTID ENDARTERECTOMY Right 10/2014   • CORONARY ANGIOPLASTY  2012    svg to obtuse marginal  branch of lcx   • CORONARY ANGIOPLASTY WITH STENT PLACEMENT  07/29/2018    pci to mid lad   • CORONARY ARTERY BYPASS GRAFT  1997    5 vessels at [facility]/ Dr Armendariz   • CORONARY STENT PLACEMENT  03/16/2015    stenting of svg graft to obtuse marginal and peripheral descending artery   • FEMORAL POPLITEAL BYPASS  11/2014   • ILIAC ARTERY STENT  12/2014   • PACEMAKER IMPLANTATION  03/29/2012    [employer]         Current Outpatient Medications:   •  amLODIPine (NORVASC) 5 MG tablet, TAKE 1 TABLET BY MOUTH  DAILY, Disp: 90 tablet, Rfl: 2  •  aspirin 81 MG tablet, 1 tablet Daily., Disp: , Rfl:   •  atenolol (TENORMIN) 25 MG tablet, TAKE 1 TABLET BY MOUTH ONCE A DAY, Disp: 90 tablet, Rfl: 2  •  atorvastatin (LIPITOR) 40 MG tablet, TAKE 1 TABLET BY MOUTH  DAILY, Disp: 90 tablet, Rfl: 2  •  clopidogrel (PLAVIX) 75 MG tablet, TAKE 1 TABLET BY MOUTH  DAILY, Disp: 90 tablet, Rfl: 2  •  lisinopril (PRINIVIL,ZESTRIL) 40 MG tablet, TAKE 1 TABLET BY MOUTH  DAILY, Disp: 90 tablet, Rfl: 3  •  budesonide-formoterol (Symbicort) 160-4.5 MCG/ACT inhaler, Inhale 2 puffs 2 (Two) Times a Day for 7 days. Then 1 puff daily there after., Disp: 1 inhaler, Rfl: 5  •  cetirizine (zyrTEC) 10 MG tablet, Take 1 tablet by mouth Every Night., Disp: 30 tablet, Rfl: 5  •  montelukast (SINGULAIR) 10 MG tablet, TAKE 1 TABLET BY MOUTH EVERY MORNING, Disp: 90 tablet, Rfl: 1    Social History     Socioeconomic History   • Marital status:      Spouse name: Not on file   • Number of children: Not on file   • Years of education: Not on file   • Highest education level: Not on file   Tobacco Use   • Smoking status: Current Every Day Smoker     Packs/day: 0.50     Types: Cigarettes   • Smokeless tobacco: Never Used   • Tobacco comment: Stop smoking   Substance and Sexual Activity   • Alcohol use: No     Frequency: Never   • Drug use: No   • Sexual activity: Defer       Family History   Problem Relation Age of Onset   • Heart disease Father   "        Review of Systems   General: denies fever, chills, anorexia, weight loss  Eyes: denies blurring, diplopia  Ear/Nose/Throat: denies ear pain, nosebleeds, hoarseness, but has significant issues with allergic rhinitis-see HPI  Cardiovascular: See HPI  Respiratory: denies excessive sputum, hemoptysis, wheezing  Gastrointestinal: denies nausea, vomiting, change in bowel habits, abdominal pain  Genitourinary: Only occasional nocturia not more than once and at most 2 times per night.  Denies hematuria or dysuria  Musculoskeletal: Minor generalized and weightbearing arthralgias, not functionally limiting.  Skin: denies rashes, itching, suspicious lesions  Neurologic: denies focal neuro deficits  Psychiatric: denies depression, anxiety  Endocrine: denies cold intolerance, heat intolerance  Hematologic/Lymphatic: denies abnormal bruising, bleeding  Allergic/Immunologic: denies urticaria or persistent infections      Objective     Visit Vitals  /68   Pulse 64   Ht 172.7 cm (67.99\")   Wt 96.2 kg (212 lb)   SpO2 100%   BMI 32.24 kg/m²           Physical Exam  General:     Mild to moderately Obese, well developed,, in no acute distress.    Head:     normocephalic and atraumatic.    Eyes:    PERRL/EOM intact, conjunctiva and sclera clear with out nystagmus.    Neck:    no jvd or bruits  Chest Wall:    no deformities   Lungs:    clear bilaterally to auscultation with adequate global airflow   Heart:    non-displaced PMI; regular rate and rhythm, normal S1, S2 without murmurs, rubs, or gallops  Abdomen:  Soft, nontender without HSM  Msk:    no deformity; adequate R OM  Pulses:    pulses normal in all 4 extremities.    Extremities:    no clubbing, cyanosis, edema   Neurologic:    no focal sensory or motor deficits  Skin:    intact with multiple solar keratoses   Psych:    alert and cooperative; normal mood and affect; normal attention span and concentration.            ECG 12 Lead  Date/Time: 8/11/2020 8:27 " AM  Performed by: DIEUDONNE Carrera MD  Authorized by: DIEUDONNE Carrera MD   Comparison: compared with previous ECG from 2/11/2020  Similar to previous ECG  Rhythm: sinus rhythm  Conduction: right bundle branch block, left anterior fascicular block and 1st degree AV block    Clinical impression: abnormal EKG  Comments: No change from previous tracing; maintained sinus rhythm with first-degree AV block, LAFB and RBBB              Assessment:   Problems Addressed this Visit        Cardiovascular and Mediastinum    Mixed hyperlipidemia  -Maintained on atorvastatin; followed by PCP      Sick sinus syndrome (CMS/HCC) - Primary    -Adequately rate controlled with demand VVI pacemaker    ECG 12 Lead (Completed)    Presence of cardiac pacemaker    -Satisfactory device site and functioning with adequate battery status        Essential hypertension    -Well-controlled on current medications as listed and reviewed in detail today.         Allergic rhinitis/bronchitis  -We will initiate Symbicort Zyrtec and montelukast         Plan:  Continue current medications as listed and reviewed in details he appears he medically stable well compensated.  -Return to clinic 6 months for clinical reassessment and device interrogation  -Prescribed Symbicort 160/4.51 puff twice daily x1 week then 1 puff daily  -Zyrtec 10 mg p.o. daily  -Montella cast 10 mg p.o. Daily      DIEUDONNE Carrera MD  8/16/2020 20:41    This report was generated using the Dragon voice recognition system.

## 2020-08-12 RX ORDER — MONTELUKAST SODIUM 10 MG/1
10 TABLET ORAL EVERY MORNING
Qty: 90 TABLET | Refills: 1 | Status: SHIPPED | OUTPATIENT
Start: 2020-08-12 | End: 2022-04-19

## 2020-08-14 NOTE — PROGRESS NOTES
DEVICE INTERROGATION:  IN OFFICE    DEVICE TYPE:   Dual-chamber pacemaker program single-chamber mode    :   [employer]    BATTERY:  Stable    TIME TO ELECTIVE REPLACEMENT INDICATORS:   3 years    CHARGE TIME:   Not applicable        LEAD DATA:        Ventricular:     12.5 mV, 590 ohms, 0.6 V@0.4 ms    LV:      Atrial pacing percentage: 0 %    Ventricular pacing percentage: 3 %      Arrhythmia Logbook Reviewed: No ventricular high rate episodes        Summary:    Stable Device Function    No significant arhythmia burden.     Battery status is stable.    Reviewed with: Dr. Carrera      NEXT IN OFFICE DEVICE CHECK DUE: 6 months    REMOTE DEVICE INTERROGATIONS: Not applicable

## 2020-08-24 RX ORDER — LISINOPRIL 40 MG/1
TABLET ORAL
Qty: 90 TABLET | Refills: 3 | Status: SHIPPED | OUTPATIENT
Start: 2020-08-24 | End: 2021-07-28 | Stop reason: SDUPTHER

## 2020-11-16 ENCOUNTER — HOSPITAL ENCOUNTER (EMERGENCY)
Facility: HOSPITAL | Age: 70
Discharge: HOME | End: 2020-11-16
Admitting: EMERGENCY MEDICINE

## 2020-11-16 VITALS
SYSTOLIC BLOOD PRESSURE: 135 MMHG | TEMPERATURE: 97.7 F | HEART RATE: 82 BPM | RESPIRATION RATE: 18 BRPM | DIASTOLIC BLOOD PRESSURE: 59 MMHG | BODY MASS INDEX: 32.34 KG/M2 | OXYGEN SATURATION: 97 % | WEIGHT: 213.41 LBS | HEIGHT: 68 IN

## 2020-11-16 DIAGNOSIS — S39.012A STRAIN OF LUMBAR REGION, INITIAL ENCOUNTER: Primary | ICD-10-CM

## 2020-11-16 PROCEDURE — 96372 THER/PROPH/DIAG INJ SC/IM: CPT

## 2020-11-16 PROCEDURE — 99283 EMERGENCY DEPT VISIT LOW MDM: CPT

## 2020-11-16 PROCEDURE — 25010000002 METHYLPREDNISOLONE PER 125 MG: Performed by: PHYSICIAN ASSISTANT

## 2020-11-16 RX ORDER — LIDOCAINE 50 MG/G
1 PATCH TOPICAL EVERY 24 HOURS
Qty: 30 PATCH | Refills: 0 | Status: SHIPPED | OUTPATIENT
Start: 2020-11-16 | End: 2021-03-08

## 2020-11-16 RX ORDER — METHOCARBAMOL 500 MG/1
500 TABLET, FILM COATED ORAL ONCE
Status: COMPLETED | OUTPATIENT
Start: 2020-11-16 | End: 2020-11-16

## 2020-11-16 RX ORDER — LIDOCAINE 50 MG/G
2 PATCH TOPICAL ONCE
Status: DISCONTINUED | OUTPATIENT
Start: 2020-11-16 | End: 2020-11-16 | Stop reason: HOSPADM

## 2020-11-16 RX ORDER — METHYLPREDNISOLONE 4 MG/1
TABLET ORAL
Qty: 21 TABLET | Refills: 0 | Status: SHIPPED | OUTPATIENT
Start: 2020-11-16 | End: 2022-04-19

## 2020-11-16 RX ORDER — ACETAMINOPHEN 500 MG
500 TABLET ORAL EVERY 6 HOURS PRN
Qty: 30 TABLET | Refills: 0 | Status: SHIPPED | OUTPATIENT

## 2020-11-16 RX ORDER — METHYLPREDNISOLONE SODIUM SUCCINATE 125 MG/2ML
125 INJECTION, POWDER, LYOPHILIZED, FOR SOLUTION INTRAMUSCULAR; INTRAVENOUS ONCE
Status: COMPLETED | OUTPATIENT
Start: 2020-11-16 | End: 2020-11-16

## 2020-11-16 RX ORDER — KETOROLAC TROMETHAMINE 10 MG/1
10 TABLET, FILM COATED ORAL ONCE
Status: COMPLETED | OUTPATIENT
Start: 2020-11-16 | End: 2020-11-16

## 2020-11-16 RX ADMIN — LIDOCAINE 2 PATCH: 50 PATCH TOPICAL at 15:10

## 2020-11-16 RX ADMIN — METHOCARBAMOL 500 MG: 500 TABLET, FILM COATED ORAL at 15:10

## 2020-11-16 RX ADMIN — METHYLPREDNISOLONE SODIUM SUCCINATE 125 MG: 125 INJECTION, POWDER, FOR SOLUTION INTRAMUSCULAR; INTRAVENOUS at 15:09

## 2020-11-16 RX ADMIN — KETOROLAC TROMETHAMINE 10 MG: 10 TABLET, FILM COATED ORAL at 15:10

## 2020-11-16 NOTE — ED NOTES
Patient has chronic back pain for the past ten years.  Patient states that he normally plays golf 2-3 times a week after two holes around 1100 his back pain increased to the point that he was having trouble walking, breathing or moving.  The pain is generalized across his lower back radiating into his left hip.  Patient tried Tylenol and it did not ease the pain.       Britt Manzo RN  11/16/20 0527       Britt Manzo RN  11/16/20 5531

## 2020-11-16 NOTE — ED PROVIDER NOTES
Subjective   History:  Patient is a pleasant 70-year-old male who presents to the ER with low back pain.  He reports he is had chronic back pain issues with difficulty with flexion extension the last 10 to 15 years but today he woke up he went golfing he was able to golf 2 holes before his back seemed to be acting up.  He reports it feels tight across his low back worse on the left than the right and it goes down his anterior thigh to mid thigh.  Denies any bowel or bladder incontinence.  Denies any true inciting injury.    Onset: 1 day  Location: low back  Duration: constant  Character: sharp pain  Aggravating/Alleviating factors: None  Radiation Left anterior thigh  Severity: mdoerate            Review of Systems   Constitutional: Negative for chills, diaphoresis, fatigue and fever.   HENT: Negative.    Respiratory: Negative for cough, choking and shortness of breath.    Cardiovascular: Negative for chest pain and palpitations.   Gastrointestinal: Negative for abdominal distention, abdominal pain, nausea and vomiting.   Genitourinary: Negative.  Negative for difficulty urinating.   Musculoskeletal: Positive for back pain.   Skin: Negative.    Neurological: Negative.    Psychiatric/Behavioral: Negative.        Past Medical History:   Diagnosis Date   • AK (actinic keratosis)     multiple   • Arthritis    • Bilateral carotid artery stenosis 4/30/2012   • CAD (coronary artery disease)    • Carotid artery stenosis    • Coronary artery disease involving native coronary artery of native heart without angina pectoris 4/30/2012    PCI to LAD 7/2018 CABG 1997    • Hyperlipemia    • Hypertension    • Mixed hyperlipidemia 8/27/2019   • Non-ST elevation myocardial infarction (NSTEMI) (CMS/[facility])     2015   • Peripheral vascular disease (CMS/[facility])    • Pre-diabetes    • Presence of cardiac pacemaker 8/28/2019    BS PM 3/2012   • Pulmonary nodule 2016    left lung   • Sick sinus syndrome (CMS/[facility]) 8/27/2019   • SSS (sick sinus  syndrome) (CMS/HCC)     intermittent AV block       No Known Allergies    Past Surgical History:   Procedure Laterality Date   • CAROTID ENDARTERECTOMY Right 10/2014   • CORONARY ANGIOPLASTY  2012    svg to obtuse marginal branch of lcx   • CORONARY ANGIOPLASTY WITH STENT PLACEMENT  07/29/2018    pci to mid lad   • CORONARY ARTERY BYPASS GRAFT  1997    5 vessels at [facility]/ Dr Armendariz   • CORONARY STENT PLACEMENT  03/16/2015    stenting of svg graft to obtuse marginal and peripheral descending artery   • FEMORAL POPLITEAL BYPASS  11/2014   • ILIAC ARTERY STENT  12/2014   • PACEMAKER IMPLANTATION  03/29/2012    [employer]       Family History   Problem Relation Age of Onset   • Heart disease Father        Social History     Socioeconomic History   • Marital status:      Spouse name: Not on file   • Number of children: Not on file   • Years of education: Not on file   • Highest education level: Not on file   Tobacco Use   • Smoking status: Current Every Day Smoker     Packs/day: 0.50     Types: Cigarettes   • Smokeless tobacco: Never Used   • Tobacco comment: Stop smoking   Substance and Sexual Activity   • Alcohol use: No     Frequency: Never   • Drug use: No   • Sexual activity: Defer           Objective   Physical Exam  Vitals signs and nursing note reviewed.   Constitutional:       Appearance: He is well-developed.   HENT:      Head: Normocephalic and atraumatic.      Nose: Nose normal.   Eyes:      Pupils: Pupils are equal, round, and reactive to light.   Neck:      Musculoskeletal: Normal range of motion.   Pulmonary:      Effort: Pulmonary effort is normal.   Musculoskeletal: Normal range of motion.      Comments: Mild amount of pain reported on left low back with palpation of lumbar paraspinal musculature. Able to ambulate without difficulty.    Skin:     General: Skin is warm and dry.   Neurological:      General: No focal deficit present.      Mental Status: He is alert and oriented to  person, place, and time.   Psychiatric:         Mood and Affect: Mood normal.         Behavior: Behavior normal.         Thought Content: Thought content normal.         Judgment: Judgment normal.         Procedures           ED Course  ED Course as of Nov 16 1601 Mon Nov 16, 2020   1557 Moderate decrease in pain    [MG]      ED Course User Index  [MG] Mihaela Carr PA-C          No radiology results for the last day  Labs Reviewed - No data to display  Medications   lidocaine (LIDODERM) 5 % 2 patch (2 patches Transdermal Medication Applied 11/16/20 1510)   ketorolac (TORADOL) tablet 10 mg (10 mg Oral Given 11/16/20 1510)   methylPREDNISolone sodium succinate (SOLU-Medrol) injection 125 mg (125 mg Intramuscular Given 11/16/20 1509)   methocarbamol (ROBAXIN) tablet 500 mg (500 mg Oral Given 11/16/20 1510)                                       MDM  Number of Diagnoses or Management Options  Strain of lumbar region, initial encounter:   Diagnosis management comments: I examined the patient using the appropriate personal protective equipment.      DISPOSITION:   Chart Review:  Comorbidity:  has a past medical history of AK (actinic keratosis), Arthritis, Bilateral carotid artery stenosis (4/30/2012), CAD (coronary artery disease), Carotid artery stenosis, Coronary artery disease involving native coronary artery of native heart without angina pectoris (4/30/2012), Hyperlipemia, Hypertension, Mixed hyperlipidemia (8/27/2019), Non-ST elevation myocardial infarction (NSTEMI) (CMS/[facility]), Peripheral vascular disease (CMS/[facility]), Pre-diabetes, Presence of cardiac pacemaker (8/28/2019), Pulmonary nodule (2016), Sick sinus syndrome (CMS/HCC) (8/27/2019), and SSS (sick sinus syndrome) (CMS/[facility]).  Differentials:this list is not all inclusive and does not constitute the entirety of considered causes --> lumbar strain, herniated disc, acute fracture    Imaging: Was interpreted by physician and reviewed by myself:  No radiology  results for the last day    Disposition/Treatment:    70-year-old male who presents to the ER with what appears to be lumbar sacral pain with radiation to his left anterior thigh.  No inciting injury but reports he has a history of chronic bad back.  The ER he was given Toradol Robaxin lidocaine patch and Solu-Medrol.  He had moderate amount and reduction in symptoms.  Due to patient's age and his last known creatinine to us which was approximately a year ago which was borderline normal to high he was given Tylenol Medrol Dosepak and a course of steroids deferred any NSAIDs to PCP who he was told to call today for an appointment later this week he was stable in agreement with plan no bowel or bladder incontinence reported    Patient Progress  Patient progress: stable      Final diagnoses:   Strain of lumbar region, initial encounter            Mihaela Carr PA-C  11/16/20 0735

## 2020-11-16 NOTE — DISCHARGE INSTRUCTIONS
Return to the ER for any worsening symptoms.  Alternate ice or heat for 15-minute increments if helpful    Follow-up with your primary care provider in 5 to 7 days as needed.

## 2020-11-17 ENCOUNTER — EPISODE CHANGES (OUTPATIENT)
Dept: CASE MANAGEMENT | Facility: OTHER | Age: 70
End: 2020-11-17

## 2020-11-23 DIAGNOSIS — E78.2 MIXED HYPERLIPIDEMIA: Primary | ICD-10-CM

## 2020-11-23 RX ORDER — ATENOLOL 25 MG/1
25 TABLET ORAL DAILY
Qty: 90 TABLET | Refills: 2 | Status: SHIPPED | OUTPATIENT
Start: 2020-11-23 | End: 2020-11-25 | Stop reason: SDUPTHER

## 2020-11-23 NOTE — TELEPHONE ENCOUNTER
Needs atenolol 25 mg sent to [facility] in [place]  It was sent to [facility]  They are completely out  They have no idea when they will restock  He is down to 5 pills

## 2020-11-25 ENCOUNTER — LAB (OUTPATIENT)
Dept: LAB | Facility: HOSPITAL | Age: 70
End: 2020-11-25

## 2020-11-25 DIAGNOSIS — E78.2 MIXED HYPERLIPIDEMIA: ICD-10-CM

## 2020-11-25 LAB
ALBUMIN SERPL-MCNC: 4.1 G/DL (ref 3.5–5.2)
ALBUMIN/GLOB SERPL: 1.6 G/DL
ALP SERPL-CCNC: 86 U/L (ref 39–117)
ALT SERPL W P-5'-P-CCNC: 22 U/L (ref 1–41)
ANION GAP SERPL CALCULATED.3IONS-SCNC: 8.3 MMOL/L (ref 5–15)
AST SERPL-CCNC: 16 U/L (ref 1–40)
BILIRUB SERPL-MCNC: 0.3 MG/DL (ref 0–1.2)
BUN SERPL-MCNC: 12 MG/DL (ref 8–23)
BUN/CREAT SERPL: 11.2 (ref 7–25)
CALCIUM SPEC-SCNC: 9.2 MG/DL (ref 8.6–10.5)
CHLORIDE SERPL-SCNC: 105 MMOL/L (ref 98–107)
CHOLEST SERPL-MCNC: 118 MG/DL (ref 0–200)
CO2 SERPL-SCNC: 28.7 MMOL/L (ref 22–29)
CREAT SERPL-MCNC: 1.07 MG/DL (ref 0.76–1.27)
GFR SERPL CREATININE-BSD FRML MDRD: 68 ML/MIN/1.73
GLOBULIN UR ELPH-MCNC: 2.5 GM/DL
GLUCOSE SERPL-MCNC: 106 MG/DL (ref 65–99)
HDLC SERPL-MCNC: 41 MG/DL (ref 40–60)
LDLC SERPL CALC-MCNC: 46 MG/DL (ref 0–100)
LDLC/HDLC SERPL: 0.94 {RATIO}
POTASSIUM SERPL-SCNC: 5 MMOL/L (ref 3.5–5.2)
PROT SERPL-MCNC: 6.6 G/DL (ref 6–8.5)
SODIUM SERPL-SCNC: 142 MMOL/L (ref 136–145)
TRIGL SERPL-MCNC: 192 MG/DL (ref 0–150)
VLDLC SERPL-MCNC: 31 MG/DL (ref 5–40)

## 2020-11-25 PROCEDURE — 80061 LIPID PANEL: CPT

## 2020-11-25 PROCEDURE — 80053 COMPREHEN METABOLIC PANEL: CPT

## 2020-11-25 PROCEDURE — 36415 COLL VENOUS BLD VENIPUNCTURE: CPT

## 2020-11-25 RX ORDER — ATENOLOL 25 MG/1
25 TABLET ORAL DAILY
Qty: 90 TABLET | Refills: 2 | Status: SHIPPED | OUTPATIENT
Start: 2020-11-25 | End: 2020-11-30 | Stop reason: SDUPTHER

## 2020-11-30 RX ORDER — ATENOLOL 25 MG/1
25 TABLET ORAL DAILY
Qty: 90 TABLET | Refills: 2 | Status: SHIPPED | OUTPATIENT
Start: 2020-11-30 | End: 2021-07-28

## 2020-11-30 NOTE — TELEPHONE ENCOUNTER
Pharmacy has not received his atenolol as of yesterday  Can this be resent to [facility] in [place]

## 2021-01-24 RX ORDER — CLOPIDOGREL BISULFATE 75 MG/1
TABLET ORAL
Qty: 90 TABLET | Refills: 3 | Status: SHIPPED | OUTPATIENT
Start: 2021-01-24 | End: 2022-01-24 | Stop reason: SDUPTHER

## 2021-01-24 RX ORDER — AMLODIPINE BESYLATE 5 MG/1
TABLET ORAL
Qty: 90 TABLET | Refills: 3 | Status: SHIPPED | OUTPATIENT
Start: 2021-01-24 | End: 2022-01-24 | Stop reason: SDUPTHER

## 2021-01-24 RX ORDER — ATORVASTATIN CALCIUM 40 MG/1
TABLET, FILM COATED ORAL
Qty: 90 TABLET | Refills: 3 | Status: SHIPPED | OUTPATIENT
Start: 2021-01-24 | End: 2022-01-24 | Stop reason: SDUPTHER

## 2021-02-16 ENCOUNTER — TELEPHONE (OUTPATIENT)
Dept: FAMILY MEDICINE CLINIC | Facility: CLINIC | Age: 71
End: 2021-02-16

## 2021-02-16 NOTE — TELEPHONE ENCOUNTER
This is Alexis Freeman's father.  Alexis said SCOTTY talked to you about seeing their whole family.  Will you see him?

## 2021-03-08 ENCOUNTER — OFFICE VISIT (OUTPATIENT)
Dept: FAMILY MEDICINE CLINIC | Facility: CLINIC | Age: 71
End: 2021-03-08

## 2021-03-08 VITALS
HEART RATE: 73 BPM | BODY MASS INDEX: 33.25 KG/M2 | DIASTOLIC BLOOD PRESSURE: 72 MMHG | OXYGEN SATURATION: 98 % | WEIGHT: 219.38 LBS | TEMPERATURE: 96.9 F | SYSTOLIC BLOOD PRESSURE: 136 MMHG | HEIGHT: 68 IN | RESPIRATION RATE: 16 BRPM

## 2021-03-08 DIAGNOSIS — M54.41 CHRONIC BILATERAL LOW BACK PAIN WITH BILATERAL SCIATICA: Primary | ICD-10-CM

## 2021-03-08 DIAGNOSIS — G89.29 CHRONIC BILATERAL LOW BACK PAIN WITH BILATERAL SCIATICA: Primary | ICD-10-CM

## 2021-03-08 DIAGNOSIS — M54.42 CHRONIC BILATERAL LOW BACK PAIN WITH BILATERAL SCIATICA: Primary | ICD-10-CM

## 2021-03-08 PROCEDURE — 99213 OFFICE O/P EST LOW 20 MIN: CPT | Performed by: PHYSICIAN ASSISTANT

## 2021-03-08 RX ORDER — PNEUMOCOCCAL VACCINE POLYVALENT 25; 25; 25; 25; 25; 25; 25; 25; 25; 25; 25; 25; 25; 25; 25; 25; 25; 25; 25; 25; 25; 25; 25 UG/.5ML; UG/.5ML; UG/.5ML; UG/.5ML; UG/.5ML; UG/.5ML; UG/.5ML; UG/.5ML; UG/.5ML; UG/.5ML; UG/.5ML; UG/.5ML; UG/.5ML; UG/.5ML; UG/.5ML; UG/.5ML; UG/.5ML; UG/.5ML; UG/.5ML; UG/.5ML; UG/.5ML; UG/.5ML; UG/.5ML
0.5 INJECTION, SOLUTION INTRAMUSCULAR; SUBCUTANEOUS ONCE
Qty: 0.5 ML | Refills: 0 | Status: CANCELLED | OUTPATIENT
Start: 2021-03-08 | End: 2021-03-08

## 2021-03-08 NOTE — PROGRESS NOTES
"Subjective   Aashish Freeman is a 70 y.o. male.     Chief Complaint   Patient presents with   • Back Pain     lower       /72 (BP Location: Right arm, Patient Position: Sitting, Cuff Size: Large Adult)   Pulse 73   Temp 96.9 °F (36.1 °C) (Skin)   Resp 16   Ht 172.7 cm (68\")   Wt 99.5 kg (219 lb 6 oz)   SpO2 98%   BMI 33.36 kg/m²     BP Readings from Last 3 Encounters:   03/08/21 136/72   11/16/20 135/59   08/11/20 120/68       Wt Readings from Last 3 Encounters:   03/08/21 99.5 kg (219 lb 6 oz)   11/16/20 96.8 kg (213 lb 6.5 oz)   08/11/20 96.2 kg (212 lb)       HPI patient presents to the clinic for bilateral lower back pain that radiates around to his quadriceps and groin.  He states this is been a chronic issue that has been going on for quite some time and was exacerbated 2 months ago when he was  playing golf.  He went to the emergency department at that time and had Toradol and Solu-Medrol and had relief of his back pain.  The pain has resumed and he states that although it is not as bad as it was it is a 3 out of 10.  He denies bowel or bladder incontinence or retention.  He denies saddle anesthesia.  He denies weakness in his lower extremities.  He has not had any imaging on his back.  He would like referred to a spine surgeon.    The following portions of the patient's history were reviewed and updated as appropriate: allergies, current medications, past family history, past medical history, past social history, past surgical history and problem list.    Review of Systems   Constitutional: Negative for fatigue and fever.   Respiratory: Negative for cough and shortness of breath.    Cardiovascular: Negative for chest pain.   Genitourinary: Negative for dysuria.   Musculoskeletal: Positive for back pain (bilateral). Negative for neck pain.   Skin: Negative for rash and bruise.   Neurological: Negative for weakness and headache.   Psychiatric/Behavioral: Negative for depressed mood. The patient is " not nervous/anxious.        Objective   Physical Exam  Constitutional:       Appearance: Normal appearance.   Eyes:      Extraocular Movements: Extraocular movements intact.      Pupils: Pupils are equal, round, and reactive to light.   Musculoskeletal:         General: Tenderness (lower back ) present. Normal range of motion.   Neurological:      General: No focal deficit present.      Mental Status: He is alert and oriented to person, place, and time.   Psychiatric:         Mood and Affect: Mood normal.         Behavior: Behavior normal.           Diagnoses and all orders for this visit:    1. Chronic bilateral low back pain with bilateral sciatica (Primary)  -     MRI Lumbar Spine Without Contrast; Future    Other orders  -     Cancel: Ambulatory Referral For Screening Colonoscopy  -     Cancel: pneumococcal polysaccharide 23-valent (Pneumovax 23) 25 MCG/0.5ML vaccine; Inject 0.5 mL into the appropriate muscle as directed by prescriber 1 (One) Time for 1 dose.  Dispense: 0.5 mL; Refill: 0  -     Cancel: Hepatitis C antibody; Future    We will begin with an MRI of his lumbar spine as he has had chronic back pain.  When we receive the results of the MRI we will consider referral to either pain management or orthopedic spine surgery.  He would also benefit likely from physical therapy.  He is not interested in any medications for his back at this time.  He can use warm heat and stretching at home as we discussed.    Return if symptoms worsen or fail to improve.

## 2021-03-26 ENCOUNTER — HOSPITAL ENCOUNTER (OUTPATIENT)
Dept: MRI IMAGING | Facility: HOSPITAL | Age: 71
Discharge: HOME | End: 2021-03-26

## 2021-03-26 DIAGNOSIS — M54.42 CHRONIC BILATERAL LOW BACK PAIN WITH BILATERAL SCIATICA: ICD-10-CM

## 2021-03-26 DIAGNOSIS — G89.29 CHRONIC BILATERAL LOW BACK PAIN WITH BILATERAL SCIATICA: ICD-10-CM

## 2021-03-26 DIAGNOSIS — M54.41 CHRONIC BILATERAL LOW BACK PAIN WITH BILATERAL SCIATICA: ICD-10-CM

## 2021-04-08 ENCOUNTER — CLINICAL SUPPORT NO REQUIREMENTS (OUTPATIENT)
Dept: CARDIOLOGY | Facility: CLINIC | Age: 71
End: 2021-04-08

## 2021-04-08 ENCOUNTER — OFFICE VISIT (OUTPATIENT)
Dept: CARDIOLOGY | Facility: CLINIC | Age: 71
End: 2021-04-08

## 2021-04-08 VITALS
OXYGEN SATURATION: 97 % | SYSTOLIC BLOOD PRESSURE: 152 MMHG | HEART RATE: 61 BPM | DIASTOLIC BLOOD PRESSURE: 80 MMHG | WEIGHT: 216 LBS | BODY MASS INDEX: 32.74 KG/M2 | HEIGHT: 68 IN

## 2021-04-08 DIAGNOSIS — Z95.0 PRESENCE OF CARDIAC PACEMAKER: ICD-10-CM

## 2021-04-08 DIAGNOSIS — I49.5 SICK SINUS SYNDROME (HCC): Primary | ICD-10-CM

## 2021-04-08 DIAGNOSIS — I10 ESSENTIAL HYPERTENSION: ICD-10-CM

## 2021-04-08 PROCEDURE — 93279 PRGRMG DEV EVAL PM/LDLS PM: CPT | Performed by: NURSE PRACTITIONER

## 2021-04-08 PROCEDURE — 93000 ELECTROCARDIOGRAM COMPLETE: CPT | Performed by: INTERNAL MEDICINE

## 2021-04-08 PROCEDURE — 99214 OFFICE O/P EST MOD 30 MIN: CPT | Performed by: INTERNAL MEDICINE

## 2021-04-16 NOTE — PROGRESS NOTES
DEVICE INTERROGATION:  IN OFFICE    DEVICE TYPE:   Dual-chamber pacemaker programmed single-chamber pacing due to atrial lead malfunction    :   [employer]    BATTERY:  Stable    TIME TO ELECTIVE REPLACEMENT INDICATORS:   2.5 years    CHARGE TIME:   Not applicable        LEAD DATA:       DEVICE REPROGRAMMED FOR TESTING      Atrial:        Ventricular:     11.1 mV, 580 ohms, 0.7 V@0.4 ms    LV:      Atrial pacing percentage: 0%    Ventricular pacing percentage: 4%      Arrhythmia Logbook Reviewed: No ventricular high rate episodes        Summary:    Stable Device Function    No significant arhythmia burden.     Battery status is stable.    Reviewed with: Dr. Carrera      NEXT IN OFFICE DEVICE CHECK DUE: 6-month    REMOTE DEVICE INTERROGATIONS: 3 months

## 2021-04-27 ENCOUNTER — APPOINTMENT (OUTPATIENT)
Dept: VASCULAR SURGERY | Facility: HOSPITAL | Age: 71
End: 2021-04-27

## 2021-04-27 PROCEDURE — G0463 HOSPITAL OUTPT CLINIC VISIT: HCPCS

## 2021-05-05 ENCOUNTER — TRANSCRIBE ORDERS (OUTPATIENT)
Dept: ADMINISTRATIVE | Facility: HOSPITAL | Age: 71
End: 2021-05-05

## 2021-05-05 DIAGNOSIS — I73.9 PERIPHERAL VASCULAR DISEASE, UNSPECIFIED (HCC): ICD-10-CM

## 2021-05-05 DIAGNOSIS — I65.23 BILATERAL CAROTID ARTERY OCCLUSION: Primary | ICD-10-CM

## 2021-06-01 ENCOUNTER — APPOINTMENT (OUTPATIENT)
Dept: CARDIOLOGY | Facility: HOSPITAL | Age: 71
End: 2021-06-01

## 2021-07-28 RX ORDER — LISINOPRIL 40 MG/1
40 TABLET ORAL DAILY
Qty: 90 TABLET | Refills: 3 | Status: SHIPPED | OUTPATIENT
Start: 2021-07-28 | End: 2022-07-25

## 2021-07-28 RX ORDER — ATENOLOL 25 MG/1
25 TABLET ORAL DAILY
Qty: 90 TABLET | Refills: 1 | Status: SHIPPED | OUTPATIENT
Start: 2021-07-28 | End: 2022-01-24 | Stop reason: SDUPTHER

## 2021-08-03 ENCOUNTER — HOSPITAL ENCOUNTER (OUTPATIENT)
Dept: CARDIOLOGY | Facility: HOSPITAL | Age: 71
Discharge: HOME | End: 2021-08-03

## 2021-08-03 DIAGNOSIS — I73.9 PERIPHERAL VASCULAR DISEASE, UNSPECIFIED (HCC): ICD-10-CM

## 2021-08-03 DIAGNOSIS — I73.9 PAD (PERIPHERAL ARTERY DISEASE) (HCC): ICD-10-CM

## 2021-08-03 DIAGNOSIS — I65.23 BILATERAL CAROTID ARTERY OCCLUSION: ICD-10-CM

## 2021-08-03 LAB
BH CV GRAFT 1 - DISTAL ANASTAMOSIS PSV-LEFT: 119 CM/S
BH CV GRAFT 1 - DISTAL ANASTAMOSIS PSV-RIGHT: 94 CM/S
BH CV GRAFT 1 - DISTAL GRAFT PSV-LEFT: 82 CM/S
BH CV GRAFT 1 - DISTAL GRAFT PSV-RIGHT: 56 CM/S
BH CV GRAFT 1 - INFLOW ARTERY PSV- LEFT: 199 CM/S
BH CV GRAFT 1 - INFLOW ARTERY PSV-RIGHT: 183 CM/S
BH CV GRAFT 1 - MID GRAFT PSV-LEFT: 117 CM/S
BH CV GRAFT 1 - MID GRAFT PSV-RIGHT: 90 CM/S
BH CV GRAFT 1 - OUTFLOW ARTERY PSV-LEFT: 65 CM/S
BH CV GRAFT 1 - OUTFLOW ARTERY PSV-RIGHT: 67 CM/S
BH CV GRAFT 1 - PROX GRAFT PSV-LEFT: 70 CM/S
BH CV GRAFT 1 - PROX GRAFT PSV-RIGHT: 60 CM/S
BH CV GRAFT 1 - PROXIMAL ANASTAMOSIS PSV-LEFT: 240 CM/S
BH CV GRAFT 1 - PROXIMAL ANASTAMOSIS PSV-RIGHT: 165 CM/S
BH CV LEA LEFT ANT TIBIAL A MID PSV: 54 CM/S
BH CV LEA LEFT CFA PROX PSV: 199 CM/S
BH CV LEA LEFT DFA PROX PSV: 69 CM/S
BH CV LEA LEFT PERONEAL  MID PSV: 40 CM/S
BH CV LEA LEFT POPITEAL A  DISTAL PSV: 65 CM/S
BH CV LEA LEFT PTA MID PSV: 63 CM/S
BH CV LEA LEFT SFA DISTAL PSV: 0 CM/S
BH CV LEA LEFT SFA MID PSV: 0 CM/S
BH CV LEA LEFT SFA PROX PSV: 0 CM/S
BH CV LEA RIGHT ANT TIBIAL A MID PSV: 68 CM/S
BH CV LEA RIGHT CFA PROX PSV: 183 CM/S
BH CV LEA RIGHT DFA PROX PSV: 53 CM/S
BH CV LEA RIGHT PERONEAL  MID PSV: 36 CM/S
BH CV LEA RIGHT POPITEAL A  DISTAL PSV: 55 CM/S
BH CV LEA RIGHT POPITEAL A  PROX PSV: 67 CM/S
BH CV LEA RIGHT PTA MID PSV: 69 CM/S
BH CV LEA RIGHT SFA DISTAL PSV: 0 CM/S
BH CV LEA RIGHT SFA MID PSV: 0 CM/S
BH CV LEA RIGHT SFA PROX PSV: 0 CM/S
BH CV LOWER ARTERIAL LEFT ABI RATIO: 1.15
BH CV LOWER ARTERIAL LEFT ABI RATIO: 1.15
BH CV LOWER ARTERIAL LEFT DORSALIS PEDIS SYS MAX: 150 MMHG
BH CV LOWER ARTERIAL LEFT GREAT TOE SYS MAX: 127 MMHG
BH CV LOWER ARTERIAL LEFT POST TIBIAL SYS MAX: 179 MMHG
BH CV LOWER ARTERIAL LEFT TBI RATIO: 0.82
BH CV LOWER ARTERIAL RIGHT ABI RATIO: 1.15
BH CV LOWER ARTERIAL RIGHT ABI RATIO: 1.15
BH CV LOWER ARTERIAL RIGHT DORSALIS PEDIS SYS MAX: 160 MMHG
BH CV LOWER ARTERIAL RIGHT GREAT TOE SYS MAX: 108 MMHG
BH CV LOWER ARTERIAL RIGHT POST TIBIAL SYS MAX: 178 MMHG
BH CV LOWER ARTERIAL RIGHT TBI RATIO: 0.7
BH CV VAS LEA LEFT HIDDEN GRAFT ALERT: 33
BH CV VAS LEA RIGHT HIDDEN GRAFT ALERT: 11
BH CV XLRA MEAS LEFT DIST CCA EDV: 16 CM/SEC
BH CV XLRA MEAS LEFT DIST CCA PSV: 71 CM/SEC
BH CV XLRA MEAS LEFT DIST ICA EDV: 28 CM/SEC
BH CV XLRA MEAS LEFT DIST ICA PSV: 93 CM/SEC
BH CV XLRA MEAS LEFT ICA/CCA RATIO: 1.3
BH CV XLRA MEAS LEFT PROX CCA EDV: 20 CM/SEC
BH CV XLRA MEAS LEFT PROX CCA PSV: 116 CM/SEC
BH CV XLRA MEAS LEFT PROX ECA PSV: 160 CM/SEC
BH CV XLRA MEAS LEFT PROX ICA EDV: 23 CM/SEC
BH CV XLRA MEAS LEFT PROX ICA PSV: 149 CM/SEC
BH CV XLRA MEAS LEFT PROX SCLA PSV: 122 CM/SEC
BH CV XLRA MEAS LEFT VERTEBRAL A EDV: 6 CM/SEC
BH CV XLRA MEAS LEFT VERTEBRAL A PSV: 36 CM/SEC
BH CV XLRA MEAS RIGHT DIST CCA EDV: 18 CM/SEC
BH CV XLRA MEAS RIGHT DIST CCA PSV: 73 CM/SEC
BH CV XLRA MEAS RIGHT DIST ICA EDV: 32 CM/SEC
BH CV XLRA MEAS RIGHT DIST ICA PSV: 99 CM/SEC
BH CV XLRA MEAS RIGHT ICA/CCA RATIO: 1.3
BH CV XLRA MEAS RIGHT PROX CCA EDV: 13 CM/SEC
BH CV XLRA MEAS RIGHT PROX CCA PSV: 79 CM/SEC
BH CV XLRA MEAS RIGHT PROX ECA PSV: 139 CM/SEC
BH CV XLRA MEAS RIGHT PROX ICA EDV: 12 CM/SEC
BH CV XLRA MEAS RIGHT PROX ICA PSV: 63 CM/SEC
BH CV XLRA MEAS RIGHT PROX SCLA PSV: 96 CM/SEC
BH CV XLRA MEAS RIGHT VERTEBRAL A EDV: 10 CM/SEC
BH CV XLRA MEAS RIGHT VERTEBRAL A PSV: 49 CM/SEC
LEFT ARM BP: 155 MMHG
MAXIMAL PREDICTED HEART RATE: 148 BPM
RIGHT ARM BP: 147 MMHG
STRESS TARGET HR: 126 BPM
UPPER ARTERIAL LEFT ARM BRACHIAL SYS MAX: 155 MMHG
UPPER ARTERIAL RIGHT ARM BRACHIAL SYS MAX: 147 MMHG

## 2021-08-03 PROCEDURE — 93922 UPR/L XTREMITY ART 2 LEVELS: CPT

## 2021-08-03 PROCEDURE — 93925 LOWER EXTREMITY STUDY: CPT

## 2021-08-03 PROCEDURE — 93880 EXTRACRANIAL BILAT STUDY: CPT

## 2021-08-10 ENCOUNTER — APPOINTMENT (OUTPATIENT)
Dept: VASCULAR SURGERY | Facility: HOSPITAL | Age: 71
End: 2021-08-10

## 2021-08-10 PROCEDURE — G0463 HOSPITAL OUTPT CLINIC VISIT: HCPCS

## 2022-01-24 RX ORDER — ATORVASTATIN CALCIUM 40 MG/1
40 TABLET, FILM COATED ORAL DAILY
Qty: 90 TABLET | Refills: 0 | Status: SHIPPED | OUTPATIENT
Start: 2022-01-24 | End: 2022-04-20

## 2022-01-24 RX ORDER — CLOPIDOGREL BISULFATE 75 MG/1
75 TABLET ORAL DAILY
Qty: 90 TABLET | Refills: 0 | Status: SHIPPED | OUTPATIENT
Start: 2022-01-24 | End: 2022-04-20

## 2022-01-24 RX ORDER — AMLODIPINE BESYLATE 5 MG/1
5 TABLET ORAL DAILY
Qty: 90 TABLET | Refills: 0 | Status: SHIPPED | OUTPATIENT
Start: 2022-01-24 | End: 2022-03-03 | Stop reason: SDUPTHER

## 2022-01-24 RX ORDER — ATENOLOL 25 MG/1
25 TABLET ORAL DAILY
Qty: 90 TABLET | Refills: 0 | Status: SHIPPED | OUTPATIENT
Start: 2022-01-24 | End: 2022-02-18 | Stop reason: SDUPTHER

## 2022-02-18 RX ORDER — ATENOLOL 25 MG/1
25 TABLET ORAL DAILY
Qty: 90 TABLET | Refills: 0 | Status: SHIPPED | OUTPATIENT
Start: 2022-02-18 | End: 2022-04-21

## 2022-03-02 ENCOUNTER — TRANSCRIBE ORDERS (OUTPATIENT)
Dept: ADMINISTRATIVE | Facility: HOSPITAL | Age: 72
End: 2022-03-02

## 2022-03-02 DIAGNOSIS — I65.23 BILATERAL CAROTID ARTERY OCCLUSION: ICD-10-CM

## 2022-03-02 DIAGNOSIS — I73.9 PERIPHERAL VASCULAR DISEASE, UNSPECIFIED: Primary | ICD-10-CM

## 2022-03-03 RX ORDER — AMLODIPINE BESYLATE 5 MG/1
5 TABLET ORAL DAILY
Qty: 90 TABLET | Refills: 0 | Status: SHIPPED | OUTPATIENT
Start: 2022-03-03 | End: 2022-05-04

## 2022-04-19 ENCOUNTER — CLINICAL SUPPORT NO REQUIREMENTS (OUTPATIENT)
Dept: CARDIOLOGY | Facility: CLINIC | Age: 72
End: 2022-04-19

## 2022-04-19 ENCOUNTER — OFFICE VISIT (OUTPATIENT)
Dept: CARDIOLOGY | Facility: CLINIC | Age: 72
End: 2022-04-19

## 2022-04-19 VITALS
HEART RATE: 81 BPM | SYSTOLIC BLOOD PRESSURE: 148 MMHG | RESPIRATION RATE: 18 BRPM | BODY MASS INDEX: 32.86 KG/M2 | HEIGHT: 68 IN | DIASTOLIC BLOOD PRESSURE: 84 MMHG | WEIGHT: 216.8 LBS

## 2022-04-19 DIAGNOSIS — I49.5 SICK SINUS SYNDROME: Primary | ICD-10-CM

## 2022-04-19 DIAGNOSIS — I25.118 CORONARY ARTERY DISEASE OF NATIVE ARTERY OF NATIVE HEART WITH STABLE ANGINA PECTORIS: Primary | ICD-10-CM

## 2022-04-19 DIAGNOSIS — Z95.0 PRESENCE OF CARDIAC PACEMAKER: ICD-10-CM

## 2022-04-19 PROCEDURE — 99214 OFFICE O/P EST MOD 30 MIN: CPT | Performed by: INTERNAL MEDICINE

## 2022-04-19 PROCEDURE — 93000 ELECTROCARDIOGRAM COMPLETE: CPT | Performed by: INTERNAL MEDICINE

## 2022-04-19 PROCEDURE — 93279 PRGRMG DEV EVAL PM/LDLS PM: CPT | Performed by: NURSE PRACTITIONER

## 2022-04-20 RX ORDER — ATORVASTATIN CALCIUM 40 MG/1
40 TABLET, FILM COATED ORAL DAILY
Qty: 90 TABLET | Refills: 3 | Status: SHIPPED | OUTPATIENT
Start: 2022-04-20

## 2022-04-20 RX ORDER — CLOPIDOGREL BISULFATE 75 MG/1
75 TABLET ORAL DAILY
Qty: 90 TABLET | Refills: 3 | Status: SHIPPED | OUTPATIENT
Start: 2022-04-20

## 2022-04-21 RX ORDER — ATENOLOL 25 MG/1
25 TABLET ORAL DAILY
Qty: 90 TABLET | Refills: 3 | Status: SHIPPED | OUTPATIENT
Start: 2022-04-21

## 2022-04-22 NOTE — PROGRESS NOTES
DEVICE INTERROGATION:  IN OFFICE    DEVICE TYPE:   Dual-chamber pacemaker programmed single-chamber mode due to atrial lead malfunction    :   [employer]    BATTERY:  Stable    TIME TO ELECTIVE REPLACEMENT INDICATORS:   2 years    CHARGE TIME:   Not applicable        LEAD DATA:       DEVICE REPROGRAMMED FOR TESTING      Atrial:   Programmed off    Ventricular:     9.9 mV, 580 ohms, 0.6 V@0.4 ms    LV:      Pacemaker dependent:   No      Atrial pacing percentage: Not applicable %    Ventricular pacing percentage: 5%      Arrhythmia Logbook Reviewed: No ventricular high rate episodes        Summary:    Stable Device Function    No significant arhythmia burden.     Battery status is stable.    Reviewed with: Dr. Curtis      NEXT IN OFFICE DEVICE CHECK DUE: 6-month    REMOTE DEVICE INTERROGATIONS: Ongoing

## 2022-05-04 RX ORDER — AMLODIPINE BESYLATE 5 MG/1
5 TABLET ORAL DAILY
Qty: 90 TABLET | Refills: 3 | Status: SHIPPED | OUTPATIENT
Start: 2022-05-04

## 2022-05-25 NOTE — PROGRESS NOTES
"Cardiology Clinic Note  Oscar Curtis MD, PhD    Subjective:     Encounter Date:04/19/2022      Patient ID: Aashish Freeman is a 71 y.o. male.    Chief Complaint:  Chief Complaint   Patient presents with   • Follow-up       HPI:    I the pleasure to see this 71-year-old gentleman in follow-up today previously followed by cardiology with history of MI, hypertension hyperlipidemia, coronary disease with bypass back in 1997, follow-up PCI in 2015, repeat ischemic work-up and echo back in 2018 most recently, he remains an everyday smoker and 1/2 pack/day was counseled to quit but no quit date established, family history of coronary and heart disease, he has a pacemaker in place, blood pressure today 148/84 with heart rates in the 80s, he is 216 pounds.  He sees vascular surgery with Dr. Conley for PVD, denies any new onset chest pain, has been on stable medicines for quite some time, no heart failure signs or symptoms no palpitations no unexplained syncope no other complaints    Review of systems otherwise negative x14 point review of systems except as mentioned above    EKG is reviewed and interpreted by me demonstrates sinus rhythm, PACs, right bundle branch block with left posterior fascicular block, normal ST-T wave findings, unchanged from prior    Cardiac therapies consist of amlodipine aspirin atenolol atorvastatin Plavix lisinopril all at stable doses    Historical data copied forward from previous encounters in EMR including the history, exam, and assessment/plan has been reviewed and is unchanged unless noted otherwise.    Cardiac medicines reviewed with risk, benefits, and necessity of each discussed.    Risk and benefit of cardiac testing reviewed including death heart attack stroke pain bleeding infection need for vascular /cardiovascular surgery were discussed and the patient     Objective:         /84 (BP Location: Left arm, Patient Position: Sitting)   Pulse 81   Resp 18   Ht 172.7 cm (67.99\") "   Wt 98.3 kg (216 lb 12.8 oz)   BMI 32.97 kg/m²     Physical Exam  Regular rate and rhythm no rubs gallops heave or lift  1 out of 6 systolic ejection murmur lower sternal border  No clubbing cyanosis or edema  Intact grossly  Clear to auscultation  Soft nontender nondistended    Assessment:         CAD, multivessel bypass surgery 1997, PCI 2015  No anginal chest pain  Stable medicines with aspirin statin and beta-blocker on board, P2 Y 12 inhibitor on board continue these    Essential hypertension well-controlled  Hyperlipidemia on statin therapy continue in combination with antiplatelets  PVD, as above, similar medicines as guideline indicated  Follow-up with vascular surgery    Pacemaker, sick sinus syndrome, interrogate device routinely, no unexplained syncope      No indication for repeat echo at this time asymptomatic no shortness of breath, stable murmur on exam      The pleasure to be involved in this patient's cardiovascular care.  Please call with any questions or concerns  Oscar Curtis MD, PhD    Most recent EKG as reviewed and interpreted by me:    ECG 12 Lead    Date/Time: 5/25/2022 12:14 PM  Performed by: Oscar Curtis MD  Authorized by: Oscar Curtis MD   Rhythm: sinus rhythm  Rate: normal  Conduction: right bundle branch block and left posterior fascicular block             Most recent echo as reviewed and interpreted by me:      Most recent stress test as reviewed and interpreted by me:      Most recent cardiac catheterization as reviewed interpreted by me:  No results found for this or any previous visit.    The following portions of the patient's history were reviewed and updated as appropriate: allergies, current medications, past family history, past medical history, past social history, past surgical history and problem list.      ROS:  14 point review of systems negative except as mentioned above    Current Outpatient Medications:   •  acetaminophen (TYLENOL) 500 MG  tablet, Take 1 tablet by mouth Every 6 (Six) Hours As Needed for Mild Pain ., Disp: 30 tablet, Rfl: 0  •  aspirin 81 MG tablet, 1 tablet Daily., Disp: , Rfl:   •  lisinopril (PRINIVIL,ZESTRIL) 40 MG tablet, Take 1 tablet by mouth Daily., Disp: 90 tablet, Rfl: 3  •  amLODIPine (NORVASC) 5 MG tablet, TAKE 1 TABLET BY MOUTH  DAILY, Disp: 90 tablet, Rfl: 3  •  atenolol (TENORMIN) 25 MG tablet, TAKE 1 TABLET BY MOUTH  DAILY, Disp: 90 tablet, Rfl: 3  •  atorvastatin (LIPITOR) 40 MG tablet, TAKE 1 TABLET BY MOUTH  DAILY, Disp: 90 tablet, Rfl: 3  •  budesonide-formoterol (Symbicort) 160-4.5 MCG/ACT inhaler, Inhale 2 puffs 2 (Two) Times a Day for 7 days. Then 1 puff daily there after., Disp: 1 inhaler, Rfl: 5  •  clopidogrel (PLAVIX) 75 MG tablet, TAKE 1 TABLET BY MOUTH  DAILY, Disp: 90 tablet, Rfl: 3    Problem List:  Patient Active Problem List   Diagnosis   • Atherosclerosis of coronary artery bypass graft   • Bilateral carotid artery stenosis   • Mixed hyperlipidemia   • Peripheral vascular disease (HCC)   • Sick sinus syndrome (HCC)   • Presence of cardiac pacemaker   • Essential hypertension   • Sprain of left knee   • Tobacco use     Past Medical History:  Past Medical History:   Diagnosis Date   • AK (actinic keratosis)     multiple   • Arthritis    • Bilateral carotid artery stenosis 4/30/2012   • CAD (coronary artery disease)    • Carotid artery stenosis    • Coronary artery disease involving native coronary artery of native heart without angina pectoris 4/30/2012    PCI to LAD 7/2018 CABG 1997    • Hyperlipemia    • Hypertension    • Mixed hyperlipidemia 8/27/2019   • Non-ST elevation myocardial infarction (NSTEMI) ([facility])     2015   • Peripheral vascular disease ([facility])    • Pre-diabetes    • Presence of cardiac pacemaker 8/28/2019    BS PM 3/2012   • Pulmonary nodule 2016    left lung   • Sick sinus syndrome (HCC) 8/27/2019   • SSS (sick sinus syndrome) ([facility])     intermittent AV block     Past Surgical  History:  Past Surgical History:   Procedure Laterality Date   • CAROTID ENDARTERECTOMY Right 10/2014   • CORONARY ANGIOPLASTY  2012    svg to obtuse marginal branch of lcx   • CORONARY ANGIOPLASTY WITH STENT PLACEMENT  07/29/2018    pci to mid lad   • CORONARY ARTERY BYPASS GRAFT  1997    5 vessels at [facility]/ Dr Armendariz   • CORONARY STENT PLACEMENT  03/16/2015    stenting of svg graft to obtuse marginal and peripheral descending artery   • FEMORAL POPLITEAL BYPASS  11/2014   • ILIAC ARTERY STENT  12/2014   • INSERT / REPLACE / REMOVE PACEMAKER     • PACEMAKER IMPLANTATION  03/29/2012    [employer]     Social History:  Social History     Socioeconomic History   • Marital status:    Tobacco Use   • Smoking status: Current Every Day Smoker     Packs/day: 0.50     Years: 40.00     Pack years: 20.00     Types: Cigarettes   • Smokeless tobacco: Never Used   • Tobacco comment: Stop smoking   Vaping Use   • Vaping Use: Never used   Substance and Sexual Activity   • Alcohol use: No   • Drug use: No   • Sexual activity: Defer     Allergies:  No Known Allergies  Immunizations:  Immunization History   Administered Date(s) Administered   • Pneumococcal Conjugate 13-Valent (PCV13) 02/07/2017   • Tdap 01/05/2020            In-Office Procedure(s):  No orders to display        ASCVD RIsk Score::  The ASCVD Risk score (Brandy CAMILO Jr., et al., 2013) failed to calculate for the following reasons:    The valid total cholesterol range is 130 to 320 mg/dL    Imaging:    Results for orders placed during the hospital encounter of 01/05/20    XR Knee 4+ View Left    Narrative  DATE OF EXAM:  1/5/2020 7:23 PM    PROCEDURE:  XR KNEE 4+ VW LEFT-    INDICATIONS:  Fall, left knee pain.    COMPARISON:  No Comparisons Available    TECHNIQUE:  Four or more radiologic views of the left knee were obtained.      FINDINGS:  There is no acute fracture or dislocation. There is narrowing and  spurring of the patellofemoral joint and medial  compartment consistent  with mild osteoarthritis. Lateral compartment is normal. There is no  knee joint effusion. There are scattered atherosclerotic vascular  calcifications.    Impression  No acute fracture or dislocation.    Electronically Signed By-Ramesh Ruiz On:1/5/2020 7:40 PM  This report was finalized on [id number] by  Ramesh Ruiz, .               Lab Review:   No visits with results within 6 Month(s) from this visit.   Latest known visit with results is:   Hospital Outpatient Visit on 08/03/2021   Component Date Value   • Target HR (85%) 08/03/2021 126    • Max. Pred. HR (100%) 08/03/2021 148    • RIGHT DORSALIS PEDIS SYS* 08/03/2021 160    • RIGHT POST TIBIAL SYS MAX 08/03/2021 178    • RIGHT GREAT TOE SYS MAX 08/03/2021 108    • RIGHT ZAHRA RATIO 08/03/2021 1.15    • RIGHT TBI RATIO 08/03/2021 0.7    • LEFT DORSALIS PEDIS SYS * 08/03/2021 150    • LEFT POST TIBIAL SYS MAX 08/03/2021 179    • LEFT GREAT TOE SYS MAX 08/03/2021 127    • LEFT ZAHRA RATIO 08/03/2021 1.15    • LEFT TBI RATIO 08/03/2021 0.82    • Upper arterial right arm* 08/03/2021 147    • Upper arterial left arm * 08/03/2021 155      Recent labs reviewed and interpreted for clinical significance and application            Level of Care:           Oscar Curtis MD  05/25/22  .

## 2022-07-25 RX ORDER — LISINOPRIL 40 MG/1
40 TABLET ORAL DAILY
Qty: 90 TABLET | Refills: 3 | Status: SHIPPED | OUTPATIENT
Start: 2022-07-25

## 2022-08-04 ENCOUNTER — HOSPITAL ENCOUNTER (OUTPATIENT)
Dept: CARDIOLOGY | Facility: HOSPITAL | Age: 72
Discharge: HOME | End: 2022-08-04

## 2022-08-04 DIAGNOSIS — I73.9 PERIPHERAL VASCULAR DISEASE, UNSPECIFIED: ICD-10-CM

## 2022-08-04 DIAGNOSIS — I65.23 BILATERAL CAROTID ARTERY OCCLUSION: ICD-10-CM

## 2022-08-04 LAB
BH CV GRAFT 1 - DISTAL ANASTAMOSIS PSV-LEFT: 38.1 CM/S
BH CV GRAFT 1 - DISTAL ANASTAMOSIS PSV-RIGHT: 89.3 CM/S
BH CV GRAFT 1 - DISTAL GRAFT PSV-LEFT: 77.1 CM/S
BH CV GRAFT 1 - DISTAL GRAFT PSV-RIGHT: 47.7 CM/S
BH CV GRAFT 1 - INFLOW ARTERY PSV- LEFT: 152 CM/S
BH CV GRAFT 1 - INFLOW ARTERY PSV-RIGHT: 144 CM/S
BH CV GRAFT 1 - MID GRAFT PSV-LEFT: 90.1 CM/S
BH CV GRAFT 1 - MID GRAFT PSV-RIGHT: 36.2 CM/S
BH CV GRAFT 1 - OUTFLOW ARTERY PSV-LEFT: 51.6 CM/S
BH CV GRAFT 1 - OUTFLOW ARTERY PSV-RIGHT: 87.6 CM/S
BH CV GRAFT 1 - PROX GRAFT PSV-LEFT: 71.9 CM/S
BH CV GRAFT 1 - PROX GRAFT PSV-RIGHT: 81.2 CM/S
BH CV GRAFT 1 - PROXIMAL ANASTAMOSIS PSV-LEFT: 119 CM/S
BH CV GRAFT 1 - PROXIMAL ANASTAMOSIS PSV-RIGHT: 143 CM/S
BH CV LEA LEFT ANT TIBIAL A MID PSV: 53.1 CM/S
BH CV LEA LEFT CFA PROX PSV: 179 CM/S
BH CV LEA LEFT DFA PROX PSV: 75.4 CM/S
BH CV LEA LEFT PERONEAL  MID PSV: 28.5 CM/S
BH CV LEA LEFT POPITEAL A  MID PSV: 67.6 CM/S
BH CV LEA LEFT PTA MID PSV: 57.2 CM/S
BH CV LEA LEFT SFA DISTAL PSV: 0 CM/S
BH CV LEA LEFT SFA MID PSV: 0 CM/S
BH CV LEA LEFT SFA PROX PSV: 0 CM/S
BH CV LEA RIGHT ANT TIBIAL A MID PSV: 45.4 CM/S
BH CV LEA RIGHT CFA PROX PSV: 150 CM/S
BH CV LEA RIGHT DFA PROX PSV: 47.8 CM/S
BH CV LEA RIGHT PERONEAL  MID PSV: 32.3 CM/S
BH CV LEA RIGHT POPITEAL A  MID PSV: 63.4 CM/S
BH CV LEA RIGHT PTA MID PSV: 50.9 CM/S
BH CV LEA RIGHT SFA DISTAL PSV: 0 CM/S
BH CV LEA RIGHT SFA MID PSV: 0 CM/S
BH CV LEA RIGHT SFA PROX PSV: 0 CM/S
BH CV LOWER ARTERIAL LEFT ABI RATIO: 1.12
BH CV LOWER ARTERIAL LEFT ABI RATIO: 1.12
BH CV LOWER ARTERIAL LEFT DORSALIS PEDIS SYS MAX: 142
BH CV LOWER ARTERIAL LEFT GREAT TOE SYS MAX: 86
BH CV LOWER ARTERIAL LEFT POST TIBIAL SYS MAX: 157
BH CV LOWER ARTERIAL LEFT TBI RATIO: 0.61
BH CV LOWER ARTERIAL RIGHT ABI RATIO: 1.01
BH CV LOWER ARTERIAL RIGHT ABI RATIO: 1.01
BH CV LOWER ARTERIAL RIGHT DORSALIS PEDIS SYS MAX: 141
BH CV LOWER ARTERIAL RIGHT GREAT TOE SYS MAX: 85
BH CV LOWER ARTERIAL RIGHT POST TIBIAL SYS MAX: 141
BH CV LOWER ARTERIAL RIGHT TBI RATIO: 0.61
BH CV XLRA MEAS LEFT DIST CCA EDV: -14.9 CM/SEC
BH CV XLRA MEAS LEFT DIST CCA PSV: -55.9 CM/SEC
BH CV XLRA MEAS LEFT DIST ICA EDV: -24.8 CM/SEC
BH CV XLRA MEAS LEFT DIST ICA PSV: -92.6 CM/SEC
BH CV XLRA MEAS LEFT PROX CCA EDV: -19.3 CM/SEC
BH CV XLRA MEAS LEFT PROX CCA PSV: -93.2 CM/SEC
BH CV XLRA MEAS LEFT PROX ECA PSV: -128 CM/SEC
BH CV XLRA MEAS LEFT PROX ICA EDV: -37.9 CM/SEC
BH CV XLRA MEAS LEFT PROX ICA PSV: -132.3 CM/SEC
BH CV XLRA MEAS LEFT PROX SCLA PSV: 137 CM/SEC
BH CV XLRA MEAS LEFT VERTEBRAL A PSV: -33.5 CM/SEC
BH CV XLRA MEAS RIGHT DIST CCA EDV: 17.4 CM/SEC
BH CV XLRA MEAS RIGHT DIST CCA PSV: 59.6 CM/SEC
BH CV XLRA MEAS RIGHT DIST ICA EDV: -25.4 CM/SEC
BH CV XLRA MEAS RIGHT DIST ICA PSV: -77.7 CM/SEC
BH CV XLRA MEAS RIGHT PROX CCA EDV: 15.5 CM/SEC
BH CV XLRA MEAS RIGHT PROX CCA PSV: 67.1 CM/SEC
BH CV XLRA MEAS RIGHT PROX ECA PSV: -120 CM/SEC
BH CV XLRA MEAS RIGHT PROX ICA EDV: -20.5 CM/SEC
BH CV XLRA MEAS RIGHT PROX ICA PSV: -64 CM/SEC
BH CV XLRA MEAS RIGHT PROX SCLA PSV: -100 CM/SEC
BH CV XLRA MEAS RIGHT VERTEBRAL A PSV: -61.3 CM/SEC
LEFT ARM BP: 139 MMHG
MAXIMAL PREDICTED HEART RATE: 147 BPM
RIGHT ARM BP: 140 MMHG
STRESS TARGET HR: 125 BPM
UPPER ARTERIAL LEFT ARM BRACHIAL SYS MAX: 139 MMHG
UPPER ARTERIAL RIGHT ARM BRACHIAL SYS MAX: 140 MMHG

## 2022-08-04 PROCEDURE — 93925 LOWER EXTREMITY STUDY: CPT

## 2022-08-04 PROCEDURE — 93922 UPR/L XTREMITY ART 2 LEVELS: CPT

## 2022-08-04 PROCEDURE — 93880 EXTRACRANIAL BILAT STUDY: CPT

## 2022-08-09 ENCOUNTER — APPOINTMENT (OUTPATIENT)
Dept: VASCULAR SURGERY | Facility: HOSPITAL | Age: 72
End: 2022-08-09

## 2022-08-09 PROCEDURE — G0463 HOSPITAL OUTPT CLINIC VISIT: HCPCS

## 2022-09-12 ENCOUNTER — TRANSCRIBE ORDERS (OUTPATIENT)
Dept: ADMINISTRATIVE | Facility: HOSPITAL | Age: 72
End: 2022-09-12

## 2022-09-12 DIAGNOSIS — I65.23 BILATERAL CAROTID ARTERY OCCLUSION: ICD-10-CM

## 2022-09-12 DIAGNOSIS — I73.9 PERIPHERAL VASCULAR DISEASE, UNSPECIFIED: Primary | ICD-10-CM

## 2022-10-20 ENCOUNTER — CLINICAL SUPPORT NO REQUIREMENTS (OUTPATIENT)
Dept: CARDIOLOGY | Facility: CLINIC | Age: 72
End: 2022-10-20

## 2022-10-20 ENCOUNTER — OFFICE VISIT (OUTPATIENT)
Dept: CARDIOLOGY | Facility: CLINIC | Age: 72
End: 2022-10-20

## 2022-10-20 VITALS
WEIGHT: 209 LBS | SYSTOLIC BLOOD PRESSURE: 178 MMHG | DIASTOLIC BLOOD PRESSURE: 90 MMHG | HEIGHT: 67 IN | HEART RATE: 71 BPM | BODY MASS INDEX: 32.8 KG/M2 | OXYGEN SATURATION: 98 %

## 2022-10-20 DIAGNOSIS — I49.5 SICK SINUS SYNDROME: Primary | ICD-10-CM

## 2022-10-20 DIAGNOSIS — I10 ESSENTIAL HYPERTENSION: ICD-10-CM

## 2022-10-20 DIAGNOSIS — Z95.0 PRESENCE OF CARDIAC PACEMAKER: ICD-10-CM

## 2022-10-20 DIAGNOSIS — I25.10 CORONARY ARTERY DISEASE INVOLVING NATIVE CORONARY ARTERY OF NATIVE HEART WITHOUT ANGINA PECTORIS: ICD-10-CM

## 2022-10-20 PROCEDURE — 93280 PM DEVICE PROGR EVAL DUAL: CPT | Performed by: NURSE PRACTITIONER

## 2022-10-20 PROCEDURE — 99213 OFFICE O/P EST LOW 20 MIN: CPT | Performed by: NURSE PRACTITIONER

## 2022-10-20 PROCEDURE — 93000 ELECTROCARDIOGRAM COMPLETE: CPT | Performed by: NURSE PRACTITIONER

## 2022-10-20 RX ORDER — MONTELUKAST SODIUM 10 MG/1
10 TABLET ORAL NIGHTLY
COMMUNITY

## 2022-10-21 NOTE — PROGRESS NOTES
Cardiology Office Follow Up Visit      Primary Care Provider:  Carlitos Carrera MD    Reason for f/u:     Sick sinus syndrome  Dual-chamber pacemaker  Atrial lead malfunction  Hypertension  Coronary artery disease    Subjective     CC:    Denies chest pain or dyspnea    History of Present Illness       Aashish Freeman is a 72 y.o. male.  Patient is a very pleasant 72-year-old male who is known to have coronary artery disease requiring previous coronary artery bypass grafting surgery in 1997.  In addition he had post bypass PCI in July 2018 to the native LAD.  He was also noted at that time to have an occluded OM branch of the left circumflex and occluded RCA.  Vein graft to the RCA was patent.  LAKE was no longer anastomosed to the LAD.    Patient has a history of intermittent AV block and sick sinus syndrome.  In 2012 he had a dual-chamber [employer] pacemaker placed.  The patient has previously been noted to have atrial lead malfunction and the device has been reprogrammed to single-chamber pacing mode with backup pacing at VVI 55 by Dr. Carrera.    Currently the patient reports he has been feeling well.  He denies any chest pain or dyspnea.  He has had no PND, orthopnea, palpitations, near syncope, lower extremity edema or feelings of his heart racing.    Log of his home blood pressures are reviewed and he reports they are typically in the 130s systolic or less and diastolic less than 80.      ASSESSMENT/PLAN:        Diagnoses and all orders for this visit:    1. Sick sinus syndrome (HCC) (Primary)  Comments:  Stable since pacemaker implant    2. Presence of cardiac pacemaker  Comments:  Dual-chamber [employer] pacemaker programmed single-chamber mode due to atrial lead malfunction    3. Essential hypertension  Comments:  Blood pressure mildly elevated in office though reported to be stable by his home blood pressure log    4. Coronary artery disease involving native coronary artery of native  heart without angina pectoris  Comments:  No signs or symptoms to suggest unstable angina           MEDICAL DECISION MAKING:          Past Medical History:   Diagnosis Date   • AK (actinic keratosis)     multiple   • Arthritis    • Bilateral carotid artery stenosis 4/30/2012   • CAD (coronary artery disease)    • Carotid artery stenosis    • Coronary artery disease involving native coronary artery of native heart without angina pectoris 4/30/2012    PCI to LAD 7/2018 CABG 1997    • Hyperlipemia    • Hypertension    • Mixed hyperlipidemia 8/27/2019   • Non-ST elevation myocardial infarction (NSTEMI) ([facility])     2015   • Peripheral vascular disease ([facility])    • Pre-diabetes    • Presence of cardiac pacemaker 8/28/2019    BS PM 3/2012   • Pulmonary nodule 2016    left lung   • Sick sinus syndrome ([facility]) 8/27/2019   • SSS (sick sinus syndrome) ([facility])     intermittent AV block       Past Surgical History:   Procedure Laterality Date   • CAROTID ENDARTERECTOMY Right 10/2014   • CORONARY ANGIOPLASTY  2012    svg to obtuse marginal branch of lcx   • CORONARY ANGIOPLASTY WITH STENT PLACEMENT  07/29/2018    pci to mid lad   • CORONARY ARTERY BYPASS GRAFT  1997    5 vessels at [facility]/ Dr Armendariz   • CORONARY STENT PLACEMENT  03/16/2015    stenting of svg graft to obtuse marginal and peripheral descending artery   • FEMORAL POPLITEAL BYPASS  11/2014   • ILIAC ARTERY STENT  12/2014   • INSERT / REPLACE / REMOVE PACEMAKER     • PACEMAKER IMPLANTATION  03/29/2012    [employer]         Current Outpatient Medications:   •  acetaminophen (TYLENOL) 500 MG tablet, Take 1 tablet by mouth Every 6 (Six) Hours As Needed for Mild Pain ., Disp: 30 tablet, Rfl: 0  •  amLODIPine (NORVASC) 5 MG tablet, TAKE 1 TABLET BY MOUTH  DAILY, Disp: 90 tablet, Rfl: 3  •  aspirin 81 MG tablet, 1 tablet Daily., Disp: , Rfl:   •  atenolol (TENORMIN) 25 MG tablet, TAKE 1 TABLET BY MOUTH  DAILY, Disp: 90 tablet, Rfl: 3  •  atorvastatin (LIPITOR) 40 MG  "tablet, TAKE 1 TABLET BY MOUTH  DAILY, Disp: 90 tablet, Rfl: 3  •  clopidogrel (PLAVIX) 75 MG tablet, TAKE 1 TABLET BY MOUTH  DAILY, Disp: 90 tablet, Rfl: 3  •  lisinopril (PRINIVIL,ZESTRIL) 40 MG tablet, TAKE 1 TABLET BY MOUTH  DAILY, Disp: 90 tablet, Rfl: 3  •  montelukast (SINGULAIR) 10 MG tablet, Take 1 tablet by mouth Every Night., Disp: , Rfl:   •  budesonide-formoterol (Symbicort) 160-4.5 MCG/ACT inhaler, Inhale 2 puffs 2 (Two) Times a Day for 7 days. Then 1 puff daily there after., Disp: 1 inhaler, Rfl: 5    Social History     Socioeconomic History   • Marital status:    Tobacco Use   • Smoking status: Every Day     Packs/day: 0.50     Years: 40.00     Pack years: 20.00     Types: Cigarettes   • Smokeless tobacco: Never   • Tobacco comments:     Stop smoking   Vaping Use   • Vaping Use: Never used   Substance and Sexual Activity   • Alcohol use: No   • Drug use: No   • Sexual activity: Defer       Family History   Problem Relation Age of Onset   • Heart disease Father        The following portions of the patient's history were reviewed and updated as appropriate: allergies, current medications, past family history, past medical history, past social history, past surgical history and problem list.    ROS    /90 Comment: 123/65 at home today  Pulse 71   Ht 170.2 cm (67\")   Wt 94.8 kg (209 lb)   SpO2 98%   BMI 32.73 kg/m² .    Objective     Vitals reviewed.   Constitutional:       General: Not in acute distress.     Appearance: Normal appearance. Well-developed.   Eyes:      Pupils: Pupils are equal, round, and reactive to light.   HENT:      Head: Normocephalic and atraumatic.   Neck:      Vascular: No JVD.   Pulmonary:      Effort: Pulmonary effort is normal.      Breath sounds: Normal breath sounds.   Cardiovascular:      Normal rate. Regular rhythm.   Pulses:     Intact distal pulses.   Edema:     Peripheral edema absent.   Abdominal:      General: There is no distension.      Palpations: " Abdomen is soft.      Tenderness: There is no abdominal tenderness.   Musculoskeletal: Normal range of motion.      Cervical back: Normal range of motion and neck supple. Skin:     General: Skin is warm and dry.   Neurological:      Mental Status: Alert and oriented to person, place, and time.           EKG ordered and reviewed by me in office    ECG 12 Lead    Date/Time: 10/20/2022 11:01 AM  Performed by: Radha Calvo APRN  Authorized by: Radha Calvo APRN   Comparison: compared with previous ECG   Similar to previous ECG  Rhythm: sinus rhythm  BPM: 65  Conduction: right bundle branch block, left posterior fascicular block and 1st degree AV block    Clinical impression: abnormal EKG                In Office Device Interrogation: Reviewed    DEVICE INTERROGATION:  IN OFFICE    DEVICE TYPE:   Dual-chamber pacemaker    :   [employer]    BATTERY:  Stable    TIME TO ELECTIVE REPLACEMENT INDICATORS:   2 years    CHARGE TIME:   Not applicable        LEAD DATA:   LEADS Reprogrammed for testing purposes    Atrial:   Atrial lead programmed off    Ventricular:     12 mV, 540 ohms, 0.4 V@0.4 ms    LV:      Pacemaker Dependent: No      Atrial pacing percentage: Not applicable %    Ventricular pacing percentage: 5%      Arrhythmia Logbook Reviewed: No ventricular high rate episodes detected        Summary:    Stable Device Function    No significant arhythmia burden.     Battery status is stable.      NEXT IN OFFICE DEVICE CHECK DUE: 1 year    REMOTE DEVICE INTERROGATIONS: Ongoing

## 2023-04-07 RX ORDER — CLOPIDOGREL BISULFATE 75 MG/1
75 TABLET ORAL DAILY
Qty: 90 TABLET | Refills: 3 | Status: SHIPPED | OUTPATIENT
Start: 2023-04-07

## 2023-04-07 RX ORDER — ATENOLOL 25 MG/1
25 TABLET ORAL DAILY
Qty: 90 TABLET | Refills: 3 | Status: SHIPPED | OUTPATIENT
Start: 2023-04-07

## 2023-04-07 RX ORDER — AMLODIPINE BESYLATE 5 MG/1
5 TABLET ORAL DAILY
Qty: 90 TABLET | Refills: 3 | Status: SHIPPED | OUTPATIENT
Start: 2023-04-07

## 2023-04-10 RX ORDER — ATORVASTATIN CALCIUM 40 MG/1
40 TABLET, FILM COATED ORAL DAILY
Qty: 90 TABLET | Refills: 0 | Status: SHIPPED | OUTPATIENT
Start: 2023-04-10

## 2023-04-19 ENCOUNTER — OFFICE VISIT (OUTPATIENT)
Dept: CARDIOLOGY | Facility: CLINIC | Age: 73
End: 2023-04-19
Payer: MEDICARE

## 2023-04-19 ENCOUNTER — CLINICAL SUPPORT NO REQUIREMENTS (OUTPATIENT)
Dept: CARDIOLOGY | Facility: CLINIC | Age: 73
End: 2023-04-19
Payer: MEDICARE

## 2023-04-19 VITALS
DIASTOLIC BLOOD PRESSURE: 84 MMHG | WEIGHT: 209 LBS | HEIGHT: 67 IN | RESPIRATION RATE: 18 BRPM | HEART RATE: 72 BPM | SYSTOLIC BLOOD PRESSURE: 146 MMHG | BODY MASS INDEX: 32.8 KG/M2

## 2023-04-19 DIAGNOSIS — I49.5 SICK SINUS SYNDROME: Primary | ICD-10-CM

## 2023-04-19 DIAGNOSIS — Z95.0 PRESENCE OF CARDIAC PACEMAKER: ICD-10-CM

## 2023-04-19 DIAGNOSIS — I65.23 BILATERAL CAROTID ARTERY OCCLUSION: ICD-10-CM

## 2023-04-19 DIAGNOSIS — I25.10 CORONARY ARTERY DISEASE INVOLVING NATIVE CORONARY ARTERY OF NATIVE HEART WITHOUT ANGINA PECTORIS: ICD-10-CM

## 2023-04-19 DIAGNOSIS — I25.708 ATHEROSCLEROSIS OF CORONARY ARTERY BYPASS GRAFT OF NATIVE HEART WITH STABLE ANGINA PECTORIS: ICD-10-CM

## 2023-04-19 DIAGNOSIS — E78.2 MIXED HYPERLIPIDEMIA: ICD-10-CM

## 2023-04-25 NOTE — PROGRESS NOTES
Cardiology Clinic Note  Oscar Curtis MD, PhD    Subjective:     Encounter Date:04/19/2023      Patient ID: Aashish Freeman is a 72 y.o. male.    Chief Complaint:  Chief Complaint   Patient presents with   • Follow-up       HPI:    I the pleasure to see this 72-year-old gentleman in follow-up today previously followed by cardiology with history of MI, hypertension hyperlipidemia, coronary disease with bypass back in 1997, follow-up PCI in 2015, repeat ischemic work-up and echo back in 2018 most recently, history of permanent pacemaker placed with sick sinus syndrome [employer] device, he remains an everyday smoker and 1/2 pack/day was counseled to quit but no quit date established, family history of coronary and heart disease, blood pressure today remains in the 140 systolic range but at home less than 140 systolic routinely by home logs, with heart rates in the 80s, he is 216 pounds with stable weights compared to last visit.  He sees vascular surgery with Dr. Conley for PVD, denies any new onset chest pain, has been on stable medicines for quite some time, no heart failure signs or symptoms no palpitations no unexplained syncope no other complaints.  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     Cardiac medicines reviewed with risk, benefits, and necessity of each discussed.     Risk and benefit of cardiac testing reviewed including death  heart attack stroke pain bleeding infection need for vascular /cardiovascular surgery were discussed and the patient      Objective:         Vitals reviewed below from today's visit, 146/84 heart rate 70s, weight 209      Home blood pressure logs reveal 1 25-1 35 systolic at goal     Physical Exam  Regular rate and rhythm no rubs gallops heave or lift  1 out of 6 systolic ejection murmur lower sternal border  No clubbing cyanosis or edema  Intact grossly  Clear to auscultation  Soft nontender nondistended  Unchanged from prior     Assessment:         Assessment          CAD, multivessel bypass surgery 1997, PCI 2015  No anginal chest pain  Stable medicines with aspirin statin and beta-blocker on board, P2 Y 12 inhibitor on board continue these     Essential hypertension well-controlled  Hyperlipidemia on statin therapy continue in combination with antiplatelets  PVD, as above, similar medicines as guideline indicated  Follow-up with vascular surgery     Pacemaker, sick sinus syndrome, interrogate device routinely, no unexplained syncope, normal functioning device, Search to Phone     Smoking cessation counseling provided, continues with tobacco abuse    No indication for repeat echo at this time asymptomatic no shortness of breath, stable murmur on exam     Return to clinic 6 months    The pleasure to be involved in this patient's cardiovascular care.  Please call with any questions or concerns  Oscar Curtis MD, PhD      Historical data copied forward from previous encounters in EMR including the history, exam, and assessment/plan has been reviewed and is unchanged unless noted otherwise.    Cardiac medicines reviewed with risk, benefits, and necessity of each discussed.    Risk and benefit of cardiac testing reviewed including death heart attack stroke pain bleeding infection need for vascular /cardiovascular surgery were discussed and the patient     Objective:         /84 (BP Location: Left arm, Patient  "Position: Sitting)   Pulse 72   Resp 18   Ht 170.2 cm (67\")   Wt 94.8 kg (209 lb)   BMI 32.73 kg/m²         The pleasure to be involved in this patient's cardiovascular care.  Please call with any questions or concerns  Oscar Curtis MD, PhD    Most recent EKG as reviewed and interpreted by me:  Procedures     Most recent echo as reviewed and interpreted by me:      Most recent stress test as reviewed and interpreted by me:      Most recent cardiac catheterization as reviewed interpreted by me:  No results found for this or any previous visit.    The following portions of the patient's history were reviewed and updated as appropriate: allergies, current medications, past family history, past medical history, past social history, past surgical history and problem list.      ROS:  14 point review of systems negative except as mentioned above    Current Outpatient Medications:   •  acetaminophen (TYLENOL) 500 MG tablet, Take 1 tablet by mouth Every 6 (Six) Hours As Needed for Mild Pain ., Disp: 30 tablet, Rfl: 0  •  amLODIPine (NORVASC) 5 MG tablet, TAKE 1 TABLET BY MOUTH  DAILY, Disp: 90 tablet, Rfl: 3  •  aspirin 81 MG tablet, 1 tablet Daily., Disp: , Rfl:   •  atenolol (TENORMIN) 25 MG tablet, TAKE 1 TABLET BY MOUTH  DAILY, Disp: 90 tablet, Rfl: 3  •  atorvastatin (LIPITOR) 40 MG tablet, TAKE 1 TABLET BY MOUTH  DAILY, Disp: 90 tablet, Rfl: 0  •  clopidogrel (PLAVIX) 75 MG tablet, TAKE 1 TABLET BY MOUTH  DAILY, Disp: 90 tablet, Rfl: 3  •  lisinopril (PRINIVIL,ZESTRIL) 40 MG tablet, TAKE 1 TABLET BY MOUTH  DAILY, Disp: 90 tablet, Rfl: 3  •  budesonide-formoterol (Symbicort) 160-4.5 MCG/ACT inhaler, Inhale 2 puffs 2 (Two) Times a Day for 7 days. Then 1 puff daily there after., Disp: 1 inhaler, Rfl: 5    Problem List:  Patient Active Problem List   Diagnosis   • Atherosclerosis of coronary artery bypass graft   • Bilateral carotid artery stenosis   • Coronary artery disease involving native coronary artery of " native heart without angina pectoris   • Mixed hyperlipidemia   • Peripheral vascular disease   • Sick sinus syndrome   • Presence of cardiac pacemaker   • Essential hypertension   • Sprain of left knee   • Tobacco use     Past Medical History:  Past Medical History:   Diagnosis Date   • AK (actinic keratosis)     multiple   • Arthritis    • Bilateral carotid artery stenosis 4/30/2012   • CAD (coronary artery disease)    • Carotid artery stenosis    • Coronary artery disease involving native coronary artery of native heart without angina pectoris 4/30/2012    PCI to LAD 7/2018 CABG 1997    • Hyperlipemia    • Hypertension    • Mixed hyperlipidemia 8/27/2019   • Non-ST elevation myocardial infarction (NSTEMI)     2015   • Peripheral vascular disease    • Pre-diabetes    • Presence of cardiac pacemaker 8/28/2019    BS PM 3/2012   • Pulmonary nodule 2016    left lung   • Sick sinus syndrome 8/27/2019   • SSS (sick sinus syndrome)     intermittent AV block     Past Surgical History:  Past Surgical History:   Procedure Laterality Date   • CAROTID ENDARTERECTOMY Right 10/2014   • CORONARY ANGIOPLASTY  2012    svg to obtuse marginal branch of lcx   • CORONARY ANGIOPLASTY WITH STENT PLACEMENT  07/29/2018    pci to mid lad   • CORONARY ARTERY BYPASS GRAFT  1997    [facility] vessels [facility]/ Dr Armendariz   • CORONARY STENT PLACEMENT  03/16/2015    stenting of svg graft to obtuse marginal and peripheral descending artery   • FEMORAL POPLITEAL BYPASS  11/2014   • ILIAC ARTERY STENT  12/2014   • INSERT / REPLACE / REMOVE PACEMAKER     • PACEMAKER IMPLANTATION  03/29/2012    [employer]     Social History:  Social History     Socioeconomic History   • Marital status:    Tobacco Use   • Smoking status: Every Day     Packs/day: 0.50     Years: 40.00     Pack years: 20.00     Types: Cigarettes   • Smokeless tobacco: Never   • Tobacco comments:     Stop smoking   Vaping Use   • Vaping Use: Never used   Substance and Sexual  Activity   • Alcohol use: No   • Drug use: No   • Sexual activity: Defer     Allergies:  No Known Allergies  Immunizations:  Immunization History   Administered Date(s) Administered   • Pneumococcal Conjugate 13-Valent (PCV13) 02/07/2017   • Tdap 01/05/2020            In-Office Procedure(s):  No orders to display        ASCVD RIsk Score::  The ASCVD Risk score (Raza CROWELL, et al., 2019) failed to calculate for the following reasons:    The valid total cholesterol range is 130 to 320 mg/dL    Imaging:    Results for orders placed during the hospital encounter of 01/05/20    XR Knee 4+ View Left    Narrative  DATE OF EXAM:  1/5/2020 7:23 PM    PROCEDURE:  XR KNEE 4+ VW LEFT-    INDICATIONS:  Fall, left knee pain.    COMPARISON:  No Comparisons Available    TECHNIQUE:  Four or more radiologic views of the left knee were obtained.      FINDINGS:  There is no acute fracture or dislocation. There is narrowing and  spurring of the patellofemoral joint and medial compartment consistent  with mild osteoarthritis. Lateral compartment is normal. There is no  knee joint effusion. There are scattered atherosclerotic vascular  calcifications.    Impression  No acute fracture or dislocation.    Electronically Signed By-Ramesh Ruiz On:1/5/2020 7:40 PM  This report was finalized on [id number] by  Ramesh Ruiz, .               Lab Review:   No visits with results within 6 Month(s) from this visit.   Latest known visit with results is:   Hospital Outpatient Visit on 08/04/2022   Component Date Value   • Target HR (85%) 08/04/2022 125    • Max. Pred. HR (100%) 08/04/2022 147    • Prox CCA PSV 08/04/2022 67.1    • Prox CCA EDV 08/04/2022 15.5    • Dist CCA PSV 08/04/2022 59.6    • Dist CCA EDV 08/04/2022 17.4    • Prox ECA PSV 08/04/2022 -120.0    • Prox ICA PSV 08/04/2022 -64.0    • Prox ICA EDV 08/04/2022 -20.5    • Dist ICA PSV 08/04/2022 -77.7    • Dist ICA EDV 08/04/2022 -25.4    • Vertebral A PSV 08/04/2022 -61.3    • Prox SCLA  PSV 08/04/2022 -100.0    • Prox CCA PSV 08/04/2022 -93.2    • Prox CCA EDV 08/04/2022 -19.3    • Dist CCA PSV 08/04/2022 -55.9    • Dist CCA EDV 08/04/2022 -14.9    • Prox ECA PSV 08/04/2022 -128.0    • Prox ICA PSV 08/04/2022 -132.3    • Prox ICA EDV 08/04/2022 -37.9    • Dist ICA PSV 08/04/2022 -92.6    • Dist ICA EDV 08/04/2022 -24.8    • Vertebral A PSV 08/04/2022 -33.5    • Prox SCLA PSV 08/04/2022 137.0    • Left arm BP 08/04/2022 139    • Right arm BP 08/04/2022 140      Recent labs reviewed and interpreted for clinical significance and application            Level of Care:           Oscar Curtis MD  04/25/23  .

## 2023-05-01 NOTE — PROGRESS NOTES
DEVICE INTERROGATION:  IN OFFICE    DEVICE TYPE:   Dual-chamber pacemaker    :   [employer]    BATTERY:  Stable    TIME TO ELECTIVE REPLACEMENT INDICATORS:   1.5 years    CHARGE TIME:   Not applicable        LEAD DATA:       DEVICE REPROGRAMMED FOR TESTING      Atrial: Programmed off    Ventricular:     12 mV, 540 ohms, 0.3 V@0.4 ms    LV:      Pacemaker dependent:   No      Atrial pacing percentage: 0%    Ventricular pacing percentage: 6%      Arrhythmia Logbook Reviewed: No A-fib        Summary:    Atrial lead currently turned off and device programmed VVI 55    No significant arhythmia burden.     Battery status is stable.    Reviewed with: Dr. Curtis    Anticipate atrial lead when generator replacement required      NEXT IN OFFICE DEVICE CHECK DUE:6 months      REMOTE DEVICE INTERROGATIONS: Not applicable

## 2023-08-01 ENCOUNTER — HOSPITAL ENCOUNTER (OUTPATIENT)
Dept: CARDIOLOGY | Facility: HOSPITAL | Age: 73
Discharge: HOME | End: 2023-08-01
Payer: MEDICARE

## 2023-08-01 ENCOUNTER — APPOINTMENT (OUTPATIENT)
Dept: VASCULAR SURGERY | Facility: HOSPITAL | Age: 73
End: 2023-08-01
Payer: MEDICARE

## 2023-08-01 DIAGNOSIS — I73.9 PERIPHERAL VASCULAR DISEASE, UNSPECIFIED: ICD-10-CM

## 2023-08-01 DIAGNOSIS — I65.23 BILATERAL CAROTID ARTERY OCCLUSION: ICD-10-CM

## 2023-08-01 LAB
BH CV GRAFT 1 - DISTAL ANASTAMOSIS PSV-LEFT: 67.7 CM/S
BH CV GRAFT 1 - DISTAL ANASTAMOSIS PSV-RIGHT: 98.2 CM/S
BH CV GRAFT 1 - DISTAL GRAFT PSV-LEFT: 62.7 CM/S
BH CV GRAFT 1 - DISTAL GRAFT PSV-RIGHT: 50.5 CM/S
BH CV GRAFT 1 - INFLOW ARTERY PSV- LEFT: 173 CM/S
BH CV GRAFT 1 - INFLOW ARTERY PSV-RIGHT: 140 CM/S
BH CV GRAFT 1 - MID GRAFT PSV-LEFT: 78.3 CM/S
BH CV GRAFT 1 - MID GRAFT PSV-RIGHT: 68.6 CM/S
BH CV GRAFT 1 - OUTFLOW ARTERY PSV-LEFT: 72.1 CM/S
BH CV GRAFT 1 - OUTFLOW ARTERY PSV-RIGHT: 63.4 CM/S
BH CV GRAFT 1 - PROX GRAFT PSV-LEFT: 85.1 CM/S
BH CV GRAFT 1 - PROX GRAFT PSV-RIGHT: 53.2 CM/S
BH CV GRAFT 1 - PROXIMAL ANASTAMOSIS PSV-LEFT: 193 CM/S
BH CV GRAFT 1 - PROXIMAL ANASTAMOSIS PSV-RIGHT: 127 CM/S
BH CV LEA LEFT ANT TIBIAL A MID PSV: -49.7 CM/S
BH CV LEA LEFT CFA PROX PSV: 173 CM/S
BH CV LEA LEFT DFA PROX PSV: 54.7 CM/S
BH CV LEA LEFT PERONEAL  MID PSV: 37.9 CM/S
BH CV LEA LEFT PTA MID PSV: 59 CM/S
BH CV LEA LEFT TIBEOPERONEAL PSV: 72.1 CM/S
BH CV LEA RIGHT ANT TIBIAL A MID PSV: -63.4 CM/S
BH CV LEA RIGHT CFA PROX PSV: 140 CM/S
BH CV LEA RIGHT DFA PROX PSV: 50.6 CM/S
BH CV LEA RIGHT PERONEAL  MID PSV: 35.7 CM/S
BH CV LEA RIGHT POPITEAL A  DISTAL PSV: 56.5 CM/S
BH CV LEA RIGHT POPITEAL A  PROX PSV: 63.4 CM/S
BH CV LEA RIGHT PTA MID PSV: 60.3 CM/S
BH CV LOWER ARTERIAL LEFT ABI RATIO: 1.17
BH CV LOWER ARTERIAL LEFT ABI RATIO: 1.17
BH CV LOWER ARTERIAL LEFT DORSALIS PEDIS SYS MAX: 159
BH CV LOWER ARTERIAL LEFT GREAT TOE SYS MAX: 105
BH CV LOWER ARTERIAL LEFT POST TIBIAL SYS MAX: 169
BH CV LOWER ARTERIAL LEFT TBI RATIO: 0.72
BH CV LOWER ARTERIAL RIGHT ABI RATIO: 1.16
BH CV LOWER ARTERIAL RIGHT ABI RATIO: 1.16
BH CV LOWER ARTERIAL RIGHT DORSALIS PEDIS SYS MAX: 165
BH CV LOWER ARTERIAL RIGHT GREAT TOE SYS MAX: 108
BH CV LOWER ARTERIAL RIGHT POST TIBIAL SYS MAX: 168
BH CV LOWER ARTERIAL RIGHT TBI RATIO: 0.74
BH CV VAS LEA RIGHT HIDDEN GRAFT ALERT: 11
BH CV XLRA MEAS LEFT DIST CCA EDV: -16.2 CM/SEC
BH CV XLRA MEAS LEFT DIST CCA PSV: -62.1 CM/SEC
BH CV XLRA MEAS LEFT DIST ICA EDV: -21.2 CM/SEC
BH CV XLRA MEAS LEFT DIST ICA PSV: -93.3 CM/SEC
BH CV XLRA MEAS LEFT ICA/CCA RATIO: 1.08
BH CV XLRA MEAS LEFT PROX CCA EDV: -14.3 CM/SEC
BH CV XLRA MEAS LEFT PROX CCA PSV: -106 CM/SEC
BH CV XLRA MEAS LEFT PROX ECA PSV: -123 CM/SEC
BH CV XLRA MEAS LEFT PROX ICA EDV: -20.4 CM/SEC
BH CV XLRA MEAS LEFT PROX ICA PSV: -115 CM/SEC
BH CV XLRA MEAS LEFT PROX SCLA PSV: 163 CM/SEC
BH CV XLRA MEAS LEFT VERTEBRAL A EDV: 11 CM/SEC
BH CV XLRA MEAS LEFT VERTEBRAL A PSV: 43.1 CM/SEC
BH CV XLRA MEAS RIGHT DIST CCA EDV: -13 CM/SEC
BH CV XLRA MEAS RIGHT DIST CCA PSV: -48.5 CM/SEC
BH CV XLRA MEAS RIGHT DIST ICA EDV: -32.1 CM/SEC
BH CV XLRA MEAS RIGHT DIST ICA PSV: -118 CM/SEC
BH CV XLRA MEAS RIGHT ICA/CCA RATIO: -1.62
BH CV XLRA MEAS RIGHT PROX CCA EDV: 13 CM/SEC
BH CV XLRA MEAS RIGHT PROX CCA PSV: 72.7 CM/SEC
BH CV XLRA MEAS RIGHT PROX ECA PSV: -111 CM/SEC
BH CV XLRA MEAS RIGHT PROX ICA EDV: -19.3 CM/SEC
BH CV XLRA MEAS RIGHT PROX ICA PSV: -73.9 CM/SEC
BH CV XLRA MEAS RIGHT PROX SCLA PSV: 115 CM/SEC
BH CV XLRA MEAS RIGHT VERTEBRAL A EDV: 12.5 CM/SEC
BH CV XLRA MEAS RIGHT VERTEBRAL A PSV: 63.5 CM/SEC
LEFT ARM BP: 137 MMHG
RIGHT ARM BP: 145 MMHG
RIGHT GROIN CFA SYS: 140 CM/SEC
UPPER ARTERIAL LEFT ARM BRACHIAL SYS MAX: 137 MMHG
UPPER ARTERIAL RIGHT ARM BRACHIAL SYS MAX: 145 MMHG

## 2023-08-01 PROCEDURE — 93880 EXTRACRANIAL BILAT STUDY: CPT

## 2023-08-01 PROCEDURE — 93925 LOWER EXTREMITY STUDY: CPT

## 2023-08-01 PROCEDURE — G0463 HOSPITAL OUTPT CLINIC VISIT: HCPCS

## 2023-08-01 PROCEDURE — 93922 UPR/L XTREMITY ART 2 LEVELS: CPT

## 2023-08-28 RX ORDER — LISINOPRIL 40 MG/1
40 TABLET ORAL DAILY
Qty: 90 TABLET | Refills: 1 | Status: SHIPPED | OUTPATIENT
Start: 2023-08-28

## 2023-10-24 ENCOUNTER — CLINICAL SUPPORT NO REQUIREMENTS (OUTPATIENT)
Dept: CARDIOLOGY | Facility: CLINIC | Age: 73
End: 2023-10-24
Payer: MEDICARE

## 2023-10-24 ENCOUNTER — OFFICE VISIT (OUTPATIENT)
Dept: CARDIOLOGY | Facility: CLINIC | Age: 73
End: 2023-10-24
Payer: MEDICARE

## 2023-10-24 VITALS
WEIGHT: 207 LBS | SYSTOLIC BLOOD PRESSURE: 150 MMHG | RESPIRATION RATE: 18 BRPM | DIASTOLIC BLOOD PRESSURE: 76 MMHG | HEART RATE: 65 BPM | BODY MASS INDEX: 32.49 KG/M2 | HEIGHT: 67 IN

## 2023-10-24 DIAGNOSIS — I49.5 SICK SINUS SYNDROME: ICD-10-CM

## 2023-10-24 DIAGNOSIS — I25.708 ATHEROSCLEROSIS OF CORONARY ARTERY BYPASS GRAFT OF NATIVE HEART WITH STABLE ANGINA PECTORIS: ICD-10-CM

## 2023-10-24 DIAGNOSIS — E78.2 MIXED HYPERLIPIDEMIA: ICD-10-CM

## 2023-10-24 DIAGNOSIS — I73.9 PERIPHERAL VASCULAR DISEASE, UNSPECIFIED: Primary | ICD-10-CM

## 2023-10-24 DIAGNOSIS — Z95.0 PRESENCE OF CARDIAC PACEMAKER: ICD-10-CM

## 2023-10-24 DIAGNOSIS — I49.5 SICK SINUS SYNDROME: Primary | ICD-10-CM

## 2023-10-24 DIAGNOSIS — I25.10 CORONARY ARTERY DISEASE INVOLVING NATIVE CORONARY ARTERY OF NATIVE HEART WITHOUT ANGINA PECTORIS: ICD-10-CM

## 2023-10-24 DIAGNOSIS — I65.23 BILATERAL CAROTID ARTERY OCCLUSION: ICD-10-CM

## 2023-10-24 PROCEDURE — 3078F DIAST BP <80 MM HG: CPT | Performed by: INTERNAL MEDICINE

## 2023-10-24 PROCEDURE — 93280 PM DEVICE PROGR EVAL DUAL: CPT | Performed by: NURSE PRACTITIONER

## 2023-10-24 PROCEDURE — 3077F SYST BP >= 140 MM HG: CPT | Performed by: INTERNAL MEDICINE

## 2023-10-24 PROCEDURE — 99214 OFFICE O/P EST MOD 30 MIN: CPT | Performed by: INTERNAL MEDICINE

## 2023-10-30 NOTE — PROGRESS NOTES
DEVICE INTERROGATION:  IN OFFICE    DEVICE TYPE:   Dual-chamber pacemaker programmed single-chamber mode    :   [employer]    BATTERY:  Stable    TIME TO ELECTIVE REPLACEMENT INDICATORS:   1.5 years    CHARGE TIME:   Not applicable        LEAD DATA:       DEVICE REPROGRAMMED FOR TESTING      Atrial:   Suspected fracture atrial lead, 40 ohms    Ventricular:     12 mV, 540 ohms, 0.4 V@1.4 ms    LV:      Pacemaker dependent:   No      Atrial pacing percentage: Not applicable %    Ventricular pacing percentage: 6%      Arrhythmia Logbook Reviewed: No high ventricular rates detected        Summary:    Stable Device Function    No significant arhythmia burden.     Battery status is stable.    Reviewed with: Dr. Curtis      NEXT IN OFFICE DEVICE CHECK DUE: 6 months    REMOTE DEVICE INTERROGATIONS:  Not currently active and home monitoring

## 2023-10-30 NOTE — PROGRESS NOTES
Cardiology Clinic Note  Oscar Curtis MD, PhD    Subjective:     Encounter Date:10/24/2023      Patient ID: Aashish Freeman is a 73 y.o. male.    Chief Complaint:  Chief Complaint   Patient presents with    Follow-up       HPI:      I the pleasure to see this 73-year-old gentleman in follow-up today previously followed by cardiology with history of MI, hypertension hyperlipidemia, coronary disease with bypass back in 1997, follow-up PCI in 2015, repeat ischemic work-up and echo back in 2018 most recently, history of permanent pacemaker placed with sick sinus syndrome [employer] device, he remains an everyday smoker and 1/2 pack/day was counseled to quit but no quit date established, family history of coronary and heart disease, blood pressure today at home 122 systolic at goal.  He sees vascular surgery with Dr. Conley for PVD, denies any new onset chest pain, has been on stable medicines for quite some time, no heart failure signs or symptoms no palpitations no unexplained syncope no other complaints.  No recent hospitalizations of CV nature or issues.  No new CV complaints today.  We discussed stopping aspirin and continuation of Plavix monotherapy indefinitely with risk reduction for CV events with peripheral disease as well as coronary disease.  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     Cardiac medicines reviewed with risk,  "benefits, and necessity of each discussed.     Risk and benefit of cardiac testing reviewed including death heart attack stroke pain bleeding infection need for vascular /cardiovascular surgery were discussed and the patient     Smoking cessation counseling provided     Objective:      Vitals reviewed, home blood pressure logs this morning 122/76 with heart rates in the 60s  Physical Exam  Regular rate and rhythm no rubs gallops heave or lift  1 out of 6 systolic ejection murmur lower sternal border  No clubbing cyanosis or edema  Intact grossly  Clear to auscultation  Soft nontender nondistended  Unchanged from prior     Assessment:           CAD, multivessel bypass surgery 1997, PCI 2015  No anginal chest pain  Stable medicines with aspirin statin and beta-blocker on board, P2 Y 12 inhibitor on board continue these     Essential hypertension well-controlled  Hyperlipidemia on statin therapy continue in combination with antiplatelets  PVD, as above, similar medicines as guideline indicated  Follow-up with vascular surgery     Pacemaker, sick sinus syndrome, interrogate device routinely, no unexplained syncope, normal functioning device, Page Mage    Today's interrogation, 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 with plan for new atrial lead with next year's generator change otherwise to VVI settings backup rate of 55, 6% V paced only doing well with no unexplained syncope, 1 year battery life remaining     Smoking cessation counseling provided, continues with tobacco abuse     No indication for repeat echo at this time asymptomatic no shortness of breath, stable murmur on exam    6-month follow-up with device interrogation    Objective:         /76 (BP Location: Left arm, Patient Position: Sitting)   Pulse 65   Resp 18   Ht 170.2 cm (67\")   Wt 93.9 kg (207 lb)   BMI 32.42 kg/m²     Home blood pressure logs 122 systolic this morning at goal      The pleasure to be involved in this patient's " cardiovascular care.  Please call with any questions or concerns  Oscar Curtis MD, PhD    Most recent EKG as reviewed and interpreted by me:  Procedures     Most recent echo as reviewed and interpreted by me:      Most recent stress test as reviewed and interpreted by me:      Most recent cardiac catheterization as reviewed interpreted by me:  No results found for this or any previous visit.    The following portions of the patient's history were reviewed and updated as appropriate: allergies, current medications, past family history, past medical history, past social history, past surgical history, and problem list.      ROS:  14 point review of systems negative except as mentioned above    Current Outpatient Medications:     acetaminophen (TYLENOL) 500 MG tablet, Take 1 tablet by mouth Every 6 (Six) Hours As Needed for Mild Pain ., Disp: 30 tablet, Rfl: 0    amLODIPine (NORVASC) 5 MG tablet, TAKE 1 TABLET BY MOUTH  DAILY, Disp: 90 tablet, Rfl: 3    atenolol (TENORMIN) 25 MG tablet, TAKE 1 TABLET BY MOUTH  DAILY, Disp: 90 tablet, Rfl: 3    atorvastatin (LIPITOR) 40 MG tablet, TAKE 1 TABLET BY MOUTH DAILY, Disp: 90 tablet, Rfl: 3    clopidogrel (PLAVIX) 75 MG tablet, TAKE 1 TABLET BY MOUTH  DAILY, Disp: 90 tablet, Rfl: 3    lisinopril (PRINIVIL,ZESTRIL) 40 MG tablet, TAKE 1 TABLET BY MOUTH  DAILY, Disp: 90 tablet, Rfl: 1    Problem List:  Patient Active Problem List   Diagnosis    Atherosclerosis of coronary artery bypass graft    Bilateral carotid artery stenosis    Coronary artery disease involving native coronary artery of native heart without angina pectoris    Mixed hyperlipidemia    Peripheral vascular disease    Sick sinus syndrome    Presence of cardiac pacemaker    Essential hypertension    Sprain of left knee    Tobacco use     Past Medical History:  Past Medical History:   Diagnosis Date    AK (actinic keratosis)     multiple    Arthritis     Bilateral carotid artery stenosis 4/30/2012    CAD (coronary  artery disease)     Carotid artery stenosis     Coronary artery disease involving native coronary artery of native heart without angina pectoris 4/30/2012    PCI to LAD 7/2018 CABG 1997     Hyperlipemia     Hypertension     Mixed hyperlipidemia 8/27/2019    Non-ST elevation myocardial infarction (NSTEMI)     2015    Peripheral vascular disease     Pre-diabetes     Presence of cardiac pacemaker 8/28/2019    BS PM 3/2012    Pulmonary nodule 2016    left lung    Sick sinus syndrome 8/27/2019    SSS (sick sinus syndrome)     intermittent AV block     Past Surgical History:  Past Surgical History:   Procedure Laterality Date    CAROTID ENDARTERECTOMY Right 10/2014    CORONARY ANGIOPLASTY  2012    svg to obtuse marginal branch of lcx    CORONARY ANGIOPLASTY WITH STENT PLACEMENT  07/29/2018    pci to mid lad    CORONARY ARTERY BYPASS GRAFT  1997    5 vessels at [facility]/ Dr Armendariz    CORONARY STENT PLACEMENT  03/16/2015    stenting of svg graft to obtuse marginal and peripheral descending artery    FEMORAL POPLITEAL BYPASS  11/2014    ILIAC ARTERY STENT  12/2014    INSERT / REPLACE / REMOVE PACEMAKER      PACEMAKER IMPLANTATION  03/29/2012    [employer]     Social History:  Social History     Socioeconomic History    Marital status:    Tobacco Use    Smoking status: Every Day     Packs/day: 0.50     Years: 40.00     Additional pack years: 0.00     Total pack years: 20.00     Types: Cigarettes    Smokeless tobacco: Never    Tobacco comments:     Stop smoking   Vaping Use    Vaping Use: Never used   Substance and Sexual Activity    Alcohol use: No    Drug use: No    Sexual activity: Defer     Allergies:  No Known Allergies  Immunizations:  Immunization History   Administered Date(s) Administered    Pneumococcal Conjugate 13-Valent (PCV13) 02/07/2017    Tdap 01/05/2020            In-Office Procedure(s):  No orders to display        ASCVD RIsk Score::  The ASCVD Risk score (Raza CROWELL, et al., 2019) failed to  calculate for the following reasons:    The valid total cholesterol range is 130 to 320 mg/dL    Imaging:    Results for orders placed during the hospital encounter of 01/05/20    XR Knee 4+ View Left    Narrative  DATE OF EXAM:  1/5/2020 7:23 PM    PROCEDURE:  XR KNEE 4+ VW LEFT-    INDICATIONS:  Fall, left knee pain.    COMPARISON:  No Comparisons Available    TECHNIQUE:  Four or more radiologic views of the left knee were obtained.      FINDINGS:  There is no acute fracture or dislocation. There is narrowing and  spurring of the patellofemoral joint and medial compartment consistent  with mild osteoarthritis. Lateral compartment is normal. There is no  knee joint effusion. There are scattered atherosclerotic vascular  calcifications.    Impression  No acute fracture or dislocation.    Electronically Signed By-Ramesh Ruiz On:1/5/2020 7:40 PM  This report was finalized on [id number] by  Ramesh Ruiz, .               Lab Review:   Hospital Outpatient Visit on 08/01/2023   Component Date Value    Ant Tibial A Mid PSV-Rig* 08/01/2023 -63.4     PTA Mid PSV-Right 08/01/2023 60.3     Peroneal Mid PSV-Right 08/01/2023 35.7     Ant Tibial A Mid PSV-Left 08/01/2023 -49.7     Peroneal Mid PSV-Left 08/01/2023 37.9     Right groin CFA sys 08/01/2023 140.0     RIGHT HIDDEN GRAFT ALERT 08/01/2023 11.0     CFA Prox PSV-Right 08/01/2023 140.00     DFA Prox PSV-Right 08/01/2023 50.60     Popiteal A Prox PSV-Right 08/01/2023 63.40     Popiteal A Distal PSV-Ri* 08/01/2023 56.50     Graft 1 Inflow Artery PS* 08/01/2023 140.00     Graft 1 Proximal Anastam* 08/01/2023 127.00     Graft 1 Prox Graft PSV-R* 08/01/2023 53.20     Graft 1 MID Graft PSV-Ri* 08/01/2023 68.60     Graft 1 Distal Graft PSV* 08/01/2023 50.50     Graft 1 Distal Anastamos* 08/01/2023 98.20     Graft 1 Outflow Artery P* 08/01/2023 63.40     CFA Prox PSV-Left 08/01/2023 173.00     DFA Prox PSV-Left 08/01/2023 54.70     Lt. Tibperoneal PSV 08/01/2023 72.10     PTA Mid  PSV-Left 08/01/2023 59.00     Graft 1 Inflow Artery PS* 08/01/2023 173.00     Graft 1 Proximal Anastam* 08/01/2023 193.00     Graft 1 Prox Graft PSV-L* 08/01/2023 85.10     Graft 1 MID Graft PSV-Le* 08/01/2023 78.30     Graft 1 Distal Graft PSV* 08/01/2023 62.70     Graft 1 Distal Anastamos* 08/01/2023 67.70     Graft 1 Outflow Artery P* 08/01/2023 72.10     RIGHT ZAHRA RATIO 08/01/2023 1.16     LEFT ZAHRA RATIO 08/01/2023 1.17    Hospital Outpatient Visit on 08/01/2023   Component Date Value    Prox CCA PSV 08/01/2023 72.7     Prox CCA EDV 08/01/2023 13.0     Dist CCA PSV 08/01/2023 -48.5     Dist CCA EDV 08/01/2023 -13.0     Prox ICA PSV 08/01/2023 -73.9     Prox ICA EDV 08/01/2023 -19.3     Dist ICA PSV 08/01/2023 -118.0     Dist ICA EDV 08/01/2023 -32.1     Prox ECA PSV 08/01/2023 -111.0     Vertebral A PSV 08/01/2023 63.5     Vertebral A EDV 08/01/2023 12.5     Prox SCLA PSV 08/01/2023 115.0     ICA/CCA ratio 08/01/2023 -1.62     Prox CCA PSV 08/01/2023 -106.0     Prox CCA EDV 08/01/2023 -14.3     Dist CCA PSV 08/01/2023 -62.1     Dist CCA EDV 08/01/2023 -16.2     Prox ICA PSV 08/01/2023 -115.0     Prox ICA EDV 08/01/2023 -20.4     Dist ICA PSV 08/01/2023 -93.3     Dist ICA EDV 08/01/2023 -21.2     Prox ECA PSV 08/01/2023 -123.0     Vertebral A PSV 08/01/2023 43.1     Vertebral A EDV 08/01/2023 11.0     Prox SCLA PSV 08/01/2023 163.0     ICA/CCA ratio 08/01/2023 1.08     Right arm BP 08/01/2023 145     Left arm BP 08/01/2023 137    Hospital Outpatient Visit on 08/01/2023   Component Date Value    Upper arterial right arm* 08/01/2023 145     Upper arterial left arm * 08/01/2023 137     RIGHT POST TIBIAL SYS MAX 08/01/2023 168     LEFT POST TIBIAL SYS MAX 08/01/2023 169     RIGHT DORSALIS PEDIS SYS* 08/01/2023 165     LEFT DORSALIS PEDIS SYS * 08/01/2023 159     RIGHT GREAT TOE SYS MAX 08/01/2023 108     LEFT GREAT TOE SYS MAX 08/01/2023 105     RIGHT ZAHRA RATIO 08/01/2023 1.16     LEFT ZAHRA RATIO 08/01/2023 1.17      RIGHT TBI RATIO 08/01/2023 0.74     LEFT TBI RATIO 08/01/2023 0.72      Recent labs reviewed and interpreted for clinical significance and application            Level of Care:           Oscar Curtis MD  10/30/23  .

## 2023-11-06 ENCOUNTER — TRANSCRIBE ORDERS (OUTPATIENT)
Dept: VASCULAR SURGERY | Facility: HOSPITAL | Age: 73
End: 2023-11-06
Payer: MEDICARE

## 2023-11-06 DIAGNOSIS — R09.89 BRUIT: Primary | ICD-10-CM

## 2023-11-06 DIAGNOSIS — I73.9 PERIPHERAL VASCULAR DISEASE, UNSPECIFIED: ICD-10-CM

## 2024-01-09 RX ORDER — LISINOPRIL 40 MG/1
40 TABLET ORAL DAILY
Qty: 90 TABLET | Refills: 3 | Status: SHIPPED | OUTPATIENT
Start: 2024-01-09

## 2024-03-12 RX ORDER — AMLODIPINE BESYLATE 5 MG/1
5 TABLET ORAL DAILY
Qty: 90 TABLET | Refills: 3 | Status: SHIPPED | OUTPATIENT
Start: 2024-03-12

## 2024-03-12 RX ORDER — CLOPIDOGREL BISULFATE 75 MG/1
75 TABLET ORAL DAILY
Qty: 90 TABLET | Refills: 3 | Status: SHIPPED | OUTPATIENT
Start: 2024-03-12

## 2024-03-12 RX ORDER — ATENOLOL 25 MG/1
25 TABLET ORAL DAILY
Qty: 90 TABLET | Refills: 3 | Status: SHIPPED | OUTPATIENT
Start: 2024-03-12

## 2024-04-30 ENCOUNTER — OFFICE VISIT (OUTPATIENT)
Dept: CARDIOLOGY | Facility: CLINIC | Age: 74
End: 2024-04-30
Payer: MEDICARE

## 2024-04-30 ENCOUNTER — CLINICAL SUPPORT NO REQUIREMENTS (OUTPATIENT)
Dept: CARDIOLOGY | Facility: CLINIC | Age: 74
End: 2024-04-30
Payer: MEDICARE

## 2024-04-30 VITALS
OXYGEN SATURATION: 97 % | RESPIRATION RATE: 18 BRPM | HEART RATE: 58 BPM | HEIGHT: 67 IN | WEIGHT: 207 LBS | DIASTOLIC BLOOD PRESSURE: 76 MMHG | BODY MASS INDEX: 32.49 KG/M2 | SYSTOLIC BLOOD PRESSURE: 136 MMHG

## 2024-04-30 DIAGNOSIS — I25.10 CORONARY ARTERY DISEASE INVOLVING NATIVE CORONARY ARTERY OF NATIVE HEART WITHOUT ANGINA PECTORIS: ICD-10-CM

## 2024-04-30 DIAGNOSIS — I49.5 SICK SINUS SYNDROME: Primary | ICD-10-CM

## 2024-04-30 DIAGNOSIS — Z95.0 PRESENCE OF CARDIAC PACEMAKER: ICD-10-CM

## 2024-04-30 DIAGNOSIS — I10 ESSENTIAL HYPERTENSION: ICD-10-CM

## 2024-04-30 NOTE — PROGRESS NOTES
Cardiology Office Follow Up Visit      Primary Care Provider:  Carlitos Carrera MD    Reason for f/u:       Dual-chamber pacemaker  Hypertension  CAD      Subjective     CC:    Denies chest pain or dyspnea    History of Present Illness       Aashish Freeman is a 73 y.o. male.  Patient is a very pleasant 73-year-old male well-known to our office who is here today to follow-up regarding his known CAD requiring previous CABG in 1997.  He had post CABG PCI in July 2018 to the native LAD.  He was also noted at that time to have an occluded OM branch of the left circumflex and occluded RCA.  Vein graft to the RCA was patent, LAKE was no longer anastomose to the LAD.    Patient has a history of intermittent AV block and sick sinus syndrome.  In 2012 he had a dual-chamber [employer] pacemaker placed.  The patient has previously been noted to have atrial lead malfunction and the device has been reprogrammed to single-chamber pacing mode with backup pacing at VVI with a rate of 55 by Dr. Carrera previously.    His battery is approaching JUSTA.  He still has 1 year remaining at this time.  He is not currently active with home monitoring due to the device age.    Currently the patient reports he has been feeling well.  He denies any chest pain, dyspnea, PND, orthopnea, palpitations, near syncope, lower extremity edema or feelings of his heart racing.  He reports compliance with prescribed medical therapy.      ASSESSMENT/PLAN:        Diagnoses and all orders for this visit:    1. Sick sinus syndrome (Primary)  Comments:  Stable since pacemaker implant    2. Essential hypertension  Comments:  Stable on current medical therapy    3. Coronary artery disease involving native coronary artery of native heart without angina pectoris  Comments:  Denies chest pain or dyspnea    4. Presence of cardiac pacemaker  Comments:  Dual-chamber [employer] pacemaker programmed single-chamber pacing due to atrial lead  malfunction           MEDICAL DECISION MAKING:  Patient appears to be stable from a cardiac standpoint.  He is signs or symptoms to suggest unstable angina.  Device function is stable with his known atrial lead malfunction.    He has 1 year remaining on his pacemaker.    We will plan on repeating a pacemaker interrogation in the office in 6 months.  At that time we will likely have to transition to every 3 months.  Anticipate referring to the EP for evaluation for generator replacement and new atrial lead placement when we get within 3 months of battery depletion.    The patient is in agreement to this plan.    I made no changes in medical therapy.  His blood pressure stable today        Past Medical History:   Diagnosis Date    AK (actinic keratosis)     multiple    Arthritis     Bilateral carotid artery stenosis 4/30/2012    CAD (coronary artery disease)     Carotid artery stenosis     Coronary artery disease involving native coronary artery of native heart without angina pectoris 4/30/2012    PCI to LAD 7/2018 CABG 1997     Hyperlipemia     Hypertension     Mixed hyperlipidemia 8/27/2019    Non-ST elevation myocardial infarction (NSTEMI)     2015    Peripheral vascular disease     Pre-diabetes     Presence of cardiac pacemaker 8/28/2019    BS PM 3/2012    Pulmonary nodule 2016    left lung    Sick sinus syndrome 8/27/2019    SSS (sick sinus syndrome)     intermittent AV block       Past Surgical History:   Procedure Laterality Date    CAROTID ENDARTERECTOMY Right 10/2014    CORONARY ANGIOPLASTY  2012    svg to obtuse marginal branch of lcx    CORONARY ANGIOPLASTY WITH STENT PLACEMENT  07/29/2018    pci to mid lad    CORONARY ARTERY BYPASS GRAFT  1997    5 vessels at [facility]/ Dr Armendariz    CORONARY STENT PLACEMENT  03/16/2015    stenting of svg graft to obtuse marginal and peripheral descending artery    FEMORAL POPLITEAL BYPASS  11/2014    ILIAC ARTERY STENT  12/2014    INSERT / REPLACE / REMOVE PACEMAKER    "   PACEMAKER IMPLANTATION  03/29/2012    [facility]         Current Outpatient Medications:     acetaminophen (TYLENOL) 500 MG tablet, Take 1 tablet by mouth Every 6 (Six) Hours As Needed for Mild Pain ., Disp: 30 tablet, Rfl: 0    amLODIPine (NORVASC) 5 MG tablet, TAKE 1 TABLET BY MOUTH DAILY, Disp: 90 tablet, Rfl: 3    atenolol (TENORMIN) 25 MG tablet, TAKE 1 TABLET BY MOUTH DAILY, Disp: 90 tablet, Rfl: 3    atorvastatin (LIPITOR) 40 MG tablet, TAKE 1 TABLET BY MOUTH DAILY, Disp: 90 tablet, Rfl: 3    clopidogrel (PLAVIX) 75 MG tablet, TAKE 1 TABLET BY MOUTH DAILY, Disp: 90 tablet, Rfl: 3    lisinopril (PRINIVIL,ZESTRIL) 40 MG tablet, TAKE 1 TABLET BY MOUTH DAILY, Disp: 90 tablet, Rfl: 3    Social History     Socioeconomic History    Marital status:    Tobacco Use    Smoking status: Every Day     Current packs/day: 0.50     Average packs/day: 0.5 packs/day for 40.0 years (20.0 ttl pk-yrs)     Types: Cigarettes    Smokeless tobacco: Never    Tobacco comments:     Stop smoking   Vaping Use    Vaping status: Never Used   Substance and Sexual Activity    Alcohol use: No    Drug use: No    Sexual activity: Defer       Family History   Problem Relation Age of Onset    Heart disease Father        The following portions of the patient's history were reviewed and updated as appropriate: allergies, current medications, past family history, past medical history, past social history, past surgical history and problem list.    Review of Systems   All other systems reviewed and are negative.    Pertinent items are noted in HPI, all other systems reviewed and negative      /76 (BP Location: Left arm, Patient Position: Sitting, Cuff Size: Large Adult)   Pulse 58   Resp 18   Ht 170.2 cm (67\")   Wt 93.9 kg (207 lb)   SpO2 97%   BMI 32.42 kg/m² .    Objective     Physical Exam      EKG ordered and reviewed by me in office    ECG 12 Lead    Date/Time: 4/30/2024 2:14 PM  Performed by: Radha Calvo, " APRN    Authorized by: Radha Calvo APRN  Comparison: compared with previous ECG   Similar to previous ECG  Rhythm: sinus rhythm  BPM: 58  Conduction: right bundle branch block and left posterior fascicular block  Q waves: III and aVF      Clinical impression: abnormal EKG              In Office Device Interrogation: Reviewed    DEVICE INTERROGATION:  IN OFFICE    DEVICE TYPE:   Dual-chamber pacemaker programmed single-chamber pacing    :   [employer]    BATTERY:  Stable    TIME TO ELECTIVE REPLACEMENT INDICATORS:   1 year    CHARGE TIME:   Not applicable        LEAD DATA:   LEADS Reprogrammed for testing purposes    Atrial:        Ventricular:     10.6 mV, 550 ohms, 0.4 V@0.4 ms    LV:      Pacemaker Dependent: No      Atrial pacing percentage: 0%    Ventricular pacing percentage: 6%      Arrhythmia Logbook Reviewed: No ventricular high rate episodes        Summary:    Stable Device Function    No significant arhythmia burden.     Battery status is stable.      NEXT IN OFFICE DEVICE CHECK DUE: 6 months    REMOTE DEVICE INTERROGATIONS: Not currently enrolled in home monitoring

## 2024-06-05 RX ORDER — ATORVASTATIN CALCIUM 40 MG/1
40 TABLET, FILM COATED ORAL DAILY
Qty: 90 TABLET | Refills: 3 | Status: SHIPPED | OUTPATIENT
Start: 2024-06-05

## 2024-08-12 NOTE — PROGRESS NOTES
[facility] VASCULAR SURGERY    Chief Complaint  Carotid Artery Disease and Peripheral Vascular Disease    Subjective          History of Present Illness  Aashish Freeman is a 73 y.o. male with carotid stenosis and PAD. He presents to the office today for follow up. He is followed by Dr. Clinton. He has not had had the following vascular interventions performed:    Right carotid endarterectomy by Dr. Traylor in October 2014  Left femoral to below-knee popliteal artery bypass with in situ GSV by Dr. Traylor in November 2014  Right femoral to AK popliteal bypass with PTFE by Dr. Traylor in November 2014  Bilateral iliac stenting in December 2014     He denies any hospitalizations or new diagnosis since we last saw him. He denies any symptoms of TIA/CVA, including amaurosis fugax, slurred speech, or unilateral paresthesias or paralysis.  He denies any symptoms of lifestyle limiting claudication.  He continues to play golf 4 days a week.  He has no rest pain or wounds.  He is maintained on Plavix and a statin. He reports compliance with his medications. He is a current everyday smoker.  He is smoking about 1 pack of cigarettes daily.  He is not interested in quitting at all.    Review of Systems   Musculoskeletal:  Negative for myalgias.   Skin:  Negative for skin lesions and wound.   Neurological:  Negative for facial asymmetry, speech difficulty and weakness.        Allergies: Patient has no known allergies.    Prior to Admission medications    Medication Sig Start Date End Date Taking? Authorizing Provider   acetaminophen (TYLENOL) 500 MG tablet Take 1 tablet by mouth Every 6 (Six) Hours As Needed for Mild Pain .    Mihaela Carr PA-C   amLODIPine (NORVASC) 5 MG tablet TAKE 1 TABLET BY MOUTH DAILY 3/12/24   Oscar Curtis MD   atenolol (TENORMIN) 25 MG tablet TAKE 1 TABLET BY MOUTH DAILY 3/12/24   Oscar Curtis MD   atorvastatin (LIPITOR) 40 MG tablet TAKE 1 TABLET BY MOUTH DAILY 6/5/24    "Oscar Curtis MD   clopidogrel (PLAVIX) 75 MG tablet TAKE 1 TABLET BY MOUTH DAILY 3/12/24   Oscar Curtis MD   lisinopril (PRINIVIL,ZESTRIL) 40 MG tablet TAKE 1 TABLET BY MOUTH DAILY 1/9/24   Augustus Robertson PA-C         Objective   Vital Signs:  /77 (BP Location: Left arm, Patient Position: Sitting, Cuff Size: Adult)   Ht 172.7 cm (68\")   Wt 93 kg (205 lb)   BMI 31.17 kg/m²   Estimated body mass index is 31.17 kg/m² as calculated from the following:    Height as of this encounter: 172.7 cm (68\").    Weight as of this encounter: 93 kg (205 lb).       Physical Exam  Vitals reviewed.   Constitutional:       General: He is not in acute distress.     Appearance: He is not ill-appearing.   Cardiovascular:      Rate and Rhythm: Normal rate.      Pulses:           Radial pulses are 2+ on the right side and 2+ on the left side.        Dorsalis pedis pulses are 2+ on the right side and 2+ on the left side.        Posterior tibial pulses are 2+ on the right side and 2+ on the left side.   Pulmonary:      Effort: Pulmonary effort is normal. No respiratory distress.   Skin:     General: Skin is warm and dry.   Neurological:      General: No focal deficit present.      Mental Status: He is alert and oriented to person, place, and time.      GCS: GCS eye subscore is 4. GCS verbal subscore is 5. GCS motor subscore is 6.        Result Review :    The following data was reviewed by: BEN Shaffer on 08/13/2024:  Duplex carotid ultrasound CAR (08/13/2024 13:49)     Doppler ankle brachial index single level CAR (08/13/2024 13:47)     Duplex lower extremity art/grafts bilateral CAR (08/13/2024 13:46)     Progress Notes by Radha Calvo APRN (04/30/2024 14:15)   Progress Notes by Oscar Curtis MD (10/24/2023 13:30)      Assessment and Plan     Diagnoses and all orders for this visit:    1. Bilateral carotid artery stenosis (Primary)  -     Duplex Carotid Ultrasound CAR; " Future    2. PAD (peripheral artery disease)  -     Doppler Ankle Brachial Index Single Level CAR; Future  -     Duplex Lower Extremity Art / Grafts - Bilateral CAR; Future    3. Essential hypertension    4. Mixed hyperlipidemia    5. Tobacco use    6. Obesity (BMI 30.0-34.9)    BMI is >= 30 and <35. (Class 1 Obesity). The following options were offered after discussion;: Information on healthy weight added to patient's after visit summary.         Aashish Freeman is a 73 y.o. male with carotid stenosis and PAD. He denies symptoms of TIA/CVA. His most recent carotid duplex shows less than 50% stenosis in the bilateral ICAs.  Surgery is not warranted unless stenosis reaches or exceeds 70% or patient becomes symptomatic.  We reviewed the signs and symptoms of stroke, he was instructed to seek emergency medical care if he experiences any of these. He verbalizes understanding.  In regards to his PAD, he denies any symptoms of lifestyle limiting claudication.  He has no rest pain or wounds.  Recent ABIs show normal arterial circulation in the bilateral lower extremities at 1.12 on the right with digit pressure of 96 mmHg and 1.21 on the left with digit pressure of 95 mmHg.  Recent duplex shows patent bilateral lower extremity bypasses without areas of significant stenosis.  He is maintained on appropriate antiplatelet, statin, and antihypertensive medications which we recommend he continue. I will plan to see the him in the office in one year with a carotid duplex, lower extremity arterial duplex, and ZAHRA. He was instructed to call our office if he had any questions or concerns.     Follow Up     Return in about 1 year (around 8/13/2025) for ZAHRA, Lower extremity duplex, Carotid duplex.    Patient was given instructions and counseling regarding his condition or for health maintenance advice. Please see specific information pulled into the AVS if appropriate.     Angle Zamorano, APRN

## 2024-08-13 ENCOUNTER — HOSPITAL ENCOUNTER (OUTPATIENT)
Dept: CARDIOLOGY | Facility: HOSPITAL | Age: 74
Discharge: HOME | End: 2024-08-13
Payer: MEDICARE

## 2024-08-13 ENCOUNTER — OFFICE VISIT (OUTPATIENT)
Age: 74
End: 2024-08-13
Payer: MEDICARE

## 2024-08-13 VITALS
SYSTOLIC BLOOD PRESSURE: 156 MMHG | HEIGHT: 68 IN | WEIGHT: 205 LBS | BODY MASS INDEX: 31.07 KG/M2 | DIASTOLIC BLOOD PRESSURE: 77 MMHG

## 2024-08-13 DIAGNOSIS — Z72.0 TOBACCO USE: ICD-10-CM

## 2024-08-13 DIAGNOSIS — I73.9 PERIPHERAL VASCULAR DISEASE, UNSPECIFIED: ICD-10-CM

## 2024-08-13 DIAGNOSIS — I10 ESSENTIAL HYPERTENSION: ICD-10-CM

## 2024-08-13 DIAGNOSIS — E78.2 MIXED HYPERLIPIDEMIA: ICD-10-CM

## 2024-08-13 DIAGNOSIS — I65.23 BILATERAL CAROTID ARTERY STENOSIS: Primary | ICD-10-CM

## 2024-08-13 DIAGNOSIS — I73.9 PAD (PERIPHERAL ARTERY DISEASE): ICD-10-CM

## 2024-08-13 DIAGNOSIS — E66.9 OBESITY (BMI 30.0-34.9): ICD-10-CM

## 2024-08-13 DIAGNOSIS — R09.89 BRUIT: ICD-10-CM

## 2024-08-13 LAB
BH CV GRAFT 1 - DISTAL ANASTAMOSIS PSV-LEFT: 78 CM/S
BH CV GRAFT 1 - DISTAL ANASTAMOSIS PSV-RIGHT: 92 CM/S
BH CV GRAFT 1 - DISTAL GRAFT PSV-LEFT: 88 CM/S
BH CV GRAFT 1 - DISTAL GRAFT PSV-RIGHT: 58 CM/S
BH CV GRAFT 1 - INFLOW ARTERY PSV- LEFT: 214 CM/S
BH CV GRAFT 1 - INFLOW ARTERY PSV-RIGHT: 122 CM/S
BH CV GRAFT 1 - MID GRAFT PSV-LEFT: 103 CM/S
BH CV GRAFT 1 - MID GRAFT PSV-RIGHT: 59 CM/S
BH CV GRAFT 1 - OUTFLOW ARTERY PSV-LEFT: 67 CM/S
BH CV GRAFT 1 - OUTFLOW ARTERY PSV-RIGHT: 75 CM/S
BH CV GRAFT 1 - PROX GRAFT PSV-LEFT: 85 CM/S
BH CV GRAFT 1 - PROX GRAFT PSV-RIGHT: 57 CM/S
BH CV GRAFT 1 - PROXIMAL ANASTAMOSIS PSV-LEFT: 192 CM/S
BH CV GRAFT 1 - PROXIMAL ANASTAMOSIS PSV-RIGHT: 125 CM/S
BH CV LEA LEFT ANT TIBIAL A MID PSV: 26 CM/S
BH CV LEA LEFT CFA PROX PSV: 214 CM/S
BH CV LEA LEFT DFA PROX PSV: 65 CM/S
BH CV LEA LEFT PERONEAL  MID PSV: 37 CM/S
BH CV LEA LEFT PTA MID PSV: 71 CM/S
BH CV LEA RIGHT ANT TIBIAL A MID PSV: 59 CM/S
BH CV LEA RIGHT CFA PROX PSV: 122 CM/S
BH CV LEA RIGHT DFA PROX PSV: 53 CM/S
BH CV LEA RIGHT PERONEAL  MID PSV: 38 CM/S
BH CV LEA RIGHT POPITEAL A  MID PSV: 63 CM/S
BH CV LEA RIGHT PTA MID PSV: 78 CM/S
BH CV LOWER ARTERIAL LEFT ABI RATIO: 1.21
BH CV LOWER ARTERIAL LEFT ABI RATIO: 1.21
BH CV LOWER ARTERIAL LEFT DORSALIS PEDIS SYS MAX: 130
BH CV LOWER ARTERIAL LEFT GREAT TOE SYS MAX: 95
BH CV LOWER ARTERIAL LEFT POST TIBIAL SYS MAX: 170
BH CV LOWER ARTERIAL LEFT TBI RATIO: 0.68
BH CV LOWER ARTERIAL RIGHT ABI RATIO: 1.12
BH CV LOWER ARTERIAL RIGHT ABI RATIO: 1.12
BH CV LOWER ARTERIAL RIGHT DORSALIS PEDIS SYS MAX: 157
BH CV LOWER ARTERIAL RIGHT GREAT TOE SYS MAX: 96
BH CV LOWER ARTERIAL RIGHT POST TIBIAL SYS MAX: 137
BH CV LOWER ARTERIAL RIGHT TBI RATIO: 0.69
BH CV VAS LEA LEFT HIDDEN GRAFT ALERT: 33
BH CV VAS LEA RIGHT HIDDEN GRAFT ALERT: 11
BH CV XLRA MEAS LEFT DIST CCA EDV: -16.5 CM/SEC
BH CV XLRA MEAS LEFT DIST CCA PSV: -79.2 CM/SEC
BH CV XLRA MEAS LEFT DIST ICA EDV: -14.9 CM/SEC
BH CV XLRA MEAS LEFT DIST ICA PSV: -71.3 CM/SEC
BH CV XLRA MEAS LEFT ICA/CCA RATIO: -1.14
BH CV XLRA MEAS LEFT PROX CCA EDV: 15.9 CM/SEC
BH CV XLRA MEAS LEFT PROX CCA PSV: 127 CM/SEC
BH CV XLRA MEAS LEFT PROX ECA PSV: -143 CM/SEC
BH CV XLRA MEAS LEFT PROX ICA EDV: -23.5 CM/SEC
BH CV XLRA MEAS LEFT PROX ICA PSV: -145 CM/SEC
BH CV XLRA MEAS LEFT PROX SCLA PSV: 168 CM/SEC
BH CV XLRA MEAS LEFT VERTEBRAL A EDV: -8.7 CM/SEC
BH CV XLRA MEAS LEFT VERTEBRAL A PSV: -41.6 CM/SEC
BH CV XLRA MEAS RIGHT DIST CCA EDV: 10.6 CM/SEC
BH CV XLRA MEAS RIGHT DIST CCA PSV: 69.6 CM/SEC
BH CV XLRA MEAS RIGHT DIST ICA EDV: -26.7 CM/SEC
BH CV XLRA MEAS RIGHT DIST ICA PSV: -88.6 CM/SEC
BH CV XLRA MEAS RIGHT ICA/CCA RATIO: -1.02
BH CV XLRA MEAS RIGHT PROX CCA EDV: 16.2 CM/SEC
BH CV XLRA MEAS RIGHT PROX CCA PSV: 87 CM/SEC
BH CV XLRA MEAS RIGHT PROX ECA PSV: -123 CM/SEC
BH CV XLRA MEAS RIGHT PROX ICA EDV: -17.2 CM/SEC
BH CV XLRA MEAS RIGHT PROX ICA PSV: -61.9 CM/SEC
BH CV XLRA MEAS RIGHT PROX SCLA PSV: 127 CM/SEC
BH CV XLRA MEAS RIGHT VERTEBRAL A EDV: 14.9 CM/SEC
BH CV XLRA MEAS RIGHT VERTEBRAL A PSV: 69 CM/SEC
UPPER ARTERIAL LEFT ARM BRACHIAL SYS MAX: 140
UPPER ARTERIAL RIGHT ARM BRACHIAL SYS MAX: 138

## 2024-08-13 PROCEDURE — 99214 OFFICE O/P EST MOD 30 MIN: CPT | Performed by: NURSE PRACTITIONER

## 2024-08-13 PROCEDURE — 93880 EXTRACRANIAL BILAT STUDY: CPT

## 2024-08-13 PROCEDURE — 1159F MED LIST DOCD IN RCRD: CPT | Performed by: NURSE PRACTITIONER

## 2024-08-13 PROCEDURE — 93880 EXTRACRANIAL BILAT STUDY: CPT | Performed by: STUDENT IN AN ORGANIZED HEALTH CARE EDUCATION/TRAINING PROGRAM

## 2024-08-13 PROCEDURE — 93925 LOWER EXTREMITY STUDY: CPT | Performed by: STUDENT IN AN ORGANIZED HEALTH CARE EDUCATION/TRAINING PROGRAM

## 2024-08-13 PROCEDURE — 3078F DIAST BP <80 MM HG: CPT | Performed by: NURSE PRACTITIONER

## 2024-08-13 PROCEDURE — 93922 UPR/L XTREMITY ART 2 LEVELS: CPT

## 2024-08-13 PROCEDURE — 93925 LOWER EXTREMITY STUDY: CPT

## 2024-08-13 PROCEDURE — 3077F SYST BP >= 140 MM HG: CPT | Performed by: NURSE PRACTITIONER

## 2024-08-13 PROCEDURE — 1160F RVW MEDS BY RX/DR IN RCRD: CPT | Performed by: NURSE PRACTITIONER

## 2024-08-13 PROCEDURE — 93922 UPR/L XTREMITY ART 2 LEVELS: CPT | Performed by: STUDENT IN AN ORGANIZED HEALTH CARE EDUCATION/TRAINING PROGRAM

## 2024-10-15 ENCOUNTER — HOSPITAL ENCOUNTER (EMERGENCY)
Facility: HOSPITAL | Age: 74
Discharge: HOME | End: 2024-10-15
Attending: EMERGENCY MEDICINE | Admitting: EMERGENCY MEDICINE
Payer: MEDICARE

## 2024-10-15 VITALS
RESPIRATION RATE: 18 BRPM | DIASTOLIC BLOOD PRESSURE: 83 MMHG | TEMPERATURE: 98 F | BODY MASS INDEX: 31.57 KG/M2 | WEIGHT: 208.34 LBS | SYSTOLIC BLOOD PRESSURE: 173 MMHG | HEART RATE: 69 BPM | HEIGHT: 68 IN | OXYGEN SATURATION: 96 %

## 2024-10-15 DIAGNOSIS — T81.30XA WOUND DEHISCENCE: Primary | ICD-10-CM

## 2024-10-15 PROCEDURE — 99282 EMERGENCY DEPT VISIT SF MDM: CPT

## 2024-10-15 NOTE — ED PROVIDER NOTES
"Subjective   History of Present Illness  Patient is a 74-year-old male who presents today with wound dehiscence.  Reports he had a dermatology appointment today where a cancerous lesion was removed from his left thigh, and sutures were placed.  Reports he was walking and felt like the bandage came loose at first.  Later on he was getting up from a seated position and states that he felt something \"give\" and noticed bleeding through the bandage.      Review of Systems   Constitutional:  Negative for fever.       Past Medical History:   Diagnosis Date    AK (actinic keratosis)     multiple    Arthritis     Bilateral carotid artery stenosis 4/30/2012    CAD (coronary artery disease)     Carotid artery stenosis     Coronary artery disease involving native coronary artery of native heart without angina pectoris 4/30/2012    PCI to LAD 7/2018 CABG 1997     Hyperlipemia     Hypertension     Mixed hyperlipidemia 8/27/2019    Non-ST elevation myocardial infarction (NSTEMI)     2015    Peripheral vascular disease     Pre-diabetes     Presence of cardiac pacemaker 8/28/2019    BS PM 3/2012    Pulmonary nodule 2016    left lung    Sick sinus syndrome 8/27/2019    SSS (sick sinus syndrome)     intermittent AV block       No Known Allergies    Past Surgical History:   Procedure Laterality Date    CAROTID ENDARTERECTOMY Right 10/2014    CORONARY ANGIOPLASTY  2012    svg to obtuse marginal branch of lcx    CORONARY ANGIOPLASTY WITH STENT PLACEMENT  07/29/2018    pci to mid lad    CORONARY ARTERY BYPASS GRAFT  1997    5 vessels at [facility]/ Dr Armendariz    CORONARY STENT PLACEMENT  03/16/2015    stenting of svg graft to obtuse marginal and peripheral descending artery    FEMORAL POPLITEAL BYPASS  11/2014    ILIAC ARTERY STENT  12/2014    INSERT / REPLACE / REMOVE PACEMAKER      PACEMAKER IMPLANTATION  03/29/2012    [employer]       Family History   Problem Relation Age of Onset    Heart disease Father        Social History " "    Socioeconomic History    Marital status:    Tobacco Use    Smoking status: Every Day     Current packs/day: 1.00     Average packs/day: 1 pack/day for 40.0 years (40.0 ttl pk-yrs)     Types: Cigarettes    Smokeless tobacco: Never   Vaping Use    Vaping status: Never Used   Substance and Sexual Activity    Alcohol use: No    Drug use: No    Sexual activity: Defer           Objective   Physical Exam  Vitals and nursing note reviewed.   Constitutional:       Appearance: Normal appearance.   HENT:      Head: Normocephalic and atraumatic.   Eyes:      Extraocular Movements: Extraocular movements intact.      Pupils: Pupils are equal, round, and reactive to light.   Musculoskeletal:         General: Signs of injury present.      Cervical back: Normal range of motion.      Comments: Wound dehiscence measuring 4 cm x 3 cm to left thigh.  No bleeding noted.  Pulses present, neurovascularly intact.   Skin:     General: Skin is warm and dry.      Capillary Refill: Capillary refill takes less than 2 seconds.   Neurological:      General: No focal deficit present.      Mental Status: He is alert and oriented to person, place, and time.   Psychiatric:         Mood and Affect: Mood normal.         Behavior: Behavior normal.         Procedures           ED Course        /83   Pulse 69   Temp 98 °F (36.7 °C)   Resp 18   Ht 172.7 cm (68\")   Wt 94.5 kg (208 lb 5.4 oz)   SpO2 96%   BMI 31.68 kg/m²   Labs Reviewed - No data to display  Medications - No data to display  No radiology results for the last day                                       Medical Decision Making  Attempted to contact Dr. Cruz with dermatology at 3 different numbers and was unable to contact him.  Wet-to-dry dressing placed.  Discussed with patient decision to discharge, with follow-up with Dr. Cruz first thing in the morning.  Patient was agreeable and verbalized understanding of all discharge instructions.    Problems Addressed:  Wound " dehiscence: acute illness or injury        Final diagnoses:   Wound dehiscence       ED Disposition  ED Disposition       ED Disposition   Discharge    Condition   Stable    Comment   --               Banet, Duane Edward, MD  [address]  [address]  [place] [address]  [phone]    Call   Call first thing in the morning for wound recheck         Medication List      No changes were made to your prescriptions during this visit.            Andre Cobian MD  10/15/24 [id number]

## 2024-10-29 ENCOUNTER — CLINICAL SUPPORT NO REQUIREMENTS (OUTPATIENT)
Dept: CARDIOLOGY | Facility: CLINIC | Age: 74
End: 2024-10-29
Payer: MEDICARE

## 2024-10-29 ENCOUNTER — OFFICE VISIT (OUTPATIENT)
Dept: CARDIOLOGY | Facility: CLINIC | Age: 74
End: 2024-10-29
Payer: MEDICARE

## 2024-10-29 VITALS
SYSTOLIC BLOOD PRESSURE: 134 MMHG | RESPIRATION RATE: 18 BRPM | HEART RATE: 60 BPM | OXYGEN SATURATION: 95 % | BODY MASS INDEX: 31.52 KG/M2 | HEIGHT: 68 IN | DIASTOLIC BLOOD PRESSURE: 75 MMHG | WEIGHT: 208 LBS

## 2024-10-29 DIAGNOSIS — I49.5 SICK SINUS SYNDROME: Primary | ICD-10-CM

## 2024-10-29 DIAGNOSIS — Z95.0 PRESENCE OF CARDIAC PACEMAKER: Primary | ICD-10-CM

## 2024-10-29 DIAGNOSIS — Z95.0 PRESENCE OF CARDIAC PACEMAKER: ICD-10-CM

## 2024-10-29 PROCEDURE — 3078F DIAST BP <80 MM HG: CPT | Performed by: INTERNAL MEDICINE

## 2024-10-29 PROCEDURE — 99214 OFFICE O/P EST MOD 30 MIN: CPT | Performed by: INTERNAL MEDICINE

## 2024-10-29 PROCEDURE — 3075F SYST BP GE 130 - 139MM HG: CPT | Performed by: INTERNAL MEDICINE

## 2024-10-30 NOTE — PROGRESS NOTES
DEVICE INTERROGATION:  IN OFFICE    DEVICE TYPE:   Dual-chamber pacemaker; programmed single-chamber mode    :   [employer]    BATTERY:  Stable    TIME TO ELECTIVE REPLACEMENT INDICATORS:   Less than 6 months    CHARGE TIME:   Not applicable        LEAD DATA:       DEVICE REPROGRAMMED FOR TESTING      Atrial:   0.9 mV, 140 ohms, 2.3 V@0.4 ms    Ventricular:     12 mV, 550 ohms, 0.6 V@0.4 ms    LV:      Pacemaker dependent:   No      Atrial pacing percentage: Not applicable %    Ventricular pacing percentage: 6%      Arrhythmia Logbook Reviewed: No ventricular high rate episodes        Summary:    Stable Device Function    No significant arhythmia burden.     Patient's device is shown previous atrial lead dysfunction with suspected fracture.  Patient's device has been reprogrammed to VVI 55.  He paces at 6%    Battery is nearing JUSTA.  Anticipate referral to EP to discuss possible addition of new atrial lead with generator replacement    Battery status is stable.    Reviewed with: Ever SUAREZ IN OFFICE DEVICE CHECK DUE: Next visit    REMOTE DEVICE INTERROGATIONS: Not currently enrolled in home monitoring

## 2024-11-11 NOTE — PROGRESS NOTES
Cardiology Clinic Note  Oscar Curtis MD, PhD    Subjective:     Encounter Date:10/29/2024      Patient ID: Aashish Freeman is a 74 y.o. male.    Chief Complaint:  Chief Complaint   Patient presents with    Follow-up       HPI:      I the pleasure to see this 74-year-old gentleman in follow-up today previously followed by cardiology with history of MI, hypertension hyperlipidemia, coronary disease with bypass back in 1997, follow-up PCI in 2015, repeat ischemic work-up and echo back in 2018 most recently, history of permanent pacemaker placed with sick sinus syndrome [employer] device, he remains an everyday smoker approximately 1/2 pack/day was counseled to quit but no quit date established, family history of coronary and heart disease, blood pressure today at home 122 systolic at goal.  He sees vascular surgery with Dr. Conley for PVD.     On follow-up today no new CV complaints today.   No new heart failure center symptoms or other issues, device interrogation as below nearing JUSTA, refer to EP.  No other issues and remains in stable pharmacotherapy today off aspirin and Plavix monotherapy in addition to high intensity statin beta-blocker afterload reduction    Pacemaker interrogation, battery close to JUSTA, referred to EP, [employer] Latitude dual-chamber pacemaker, 6% ventricularly paced, 0% atrially paced, mode is VVI     Review of systems otherwise negative x14 point review of systems except as mentioned above     EKG on prior encounter sinus rhythm, PACs, right bundle branch block with left posterior fascicular block, normal ST-T wave findings, unchanged from prior        Historical data copied forward from previous encounters in EMR including the history, exam, and assessment/plan has been reviewed and is unchanged unless noted otherwise.     Cardiac medicines reviewed with risk, benefits, and necessity of each discussed.     Risk and benefit of cardiac testing reviewed including death heart  "attack stroke pain bleeding infection need for vascular /cardiovascular surgery were discussed and the patient      Smoking cessation counseling provided     Objective:      Vitals reviewed, home blood pressure logs this morning 122/76 with heart rates in the 60s  Physical Exam  Regular rate and rhythm no rubs gallops heave or lift  1 out of 6 systolic ejection murmur lower sternal border  No clubbing cyanosis or edema  Intact grossly  Clear to auscultation  Soft nontender nondistended  Unchanged from prior     Assessment:            CAD, multivessel bypass surgery 1997, PCI 2015  No anginal chest pain  Stable medicines with statin and beta-blocker on board, P2 Y 12 inhibitor on board continue these     Essential hypertension well-controlled  Hyperlipidemia on statin therapy continue in combination with antiplatelets  PVD, as above, similar medicines as guideline indicated  Follow-up with vascular surgery     Pacemaker, sick sinus syndrome, interrogate device routinely, no unexplained syncope, normal functioning device, [employer]     Today's interrogation, device life only 3 months, referral to EP  Notably 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 with plan for new atrial lead with next year's generator change otherwise to VVI settings backup rate of 55, 6% V paced only doing well with no unexplained syncope     Smoking cessation counseling provided, continues with tobacco abuse     No indication for repeat echo at this time asymptomatic no shortness of breath, stable murmur on exam     6-month follow-up with device interrogation after placement of battery and atrial lead revision    Objective:         /75 (BP Location: Left arm, Patient Position: Sitting)   Pulse 60   Resp 18   Ht 172.7 cm (68\")   Wt 94.3 kg (208 lb)   SpO2 95%   BMI 31.63 kg/m²     Physical Exam    Assessment:         Diagnoses and all orders for this visit:    1. Presence of cardiac pacemaker (Primary)  Overview:  BS PM " 3/2012    Orders:  -     Ambulatory Referral to Cardiac Electrophysiology           Plan:              The pleasure to be involved in this patient's cardiovascular care.  Please call with any questions or concerns  Oscar Curtis MD, PhD    Most recent EKG as reviewed and interpreted by me:  Procedures     Most recent echo as reviewed and interpreted by me:      Most recent stress test as reviewed and interpreted by me:      Most recent cardiac catheterization as reviewed interpreted by me:  No results found for this or any previous visit.    The following portions of the patient's history were reviewed and updated as appropriate: allergies, current medications, past family history, past medical history, past social history, past surgical history, and problem list.      ROS:  14 point review of systems negative except as mentioned above    Current Outpatient Medications:     acetaminophen (TYLENOL) 500 MG tablet, Take 1 tablet by mouth Every 6 (Six) Hours As Needed for Mild Pain ., Disp: 30 tablet, Rfl: 0    amLODIPine (NORVASC) 5 MG tablet, TAKE 1 TABLET BY MOUTH DAILY, Disp: 90 tablet, Rfl: 3    atenolol (TENORMIN) 25 MG tablet, TAKE 1 TABLET BY MOUTH DAILY, Disp: 90 tablet, Rfl: 3    atorvastatin (LIPITOR) 40 MG tablet, TAKE 1 TABLET BY MOUTH DAILY, Disp: 90 tablet, Rfl: 3    clopidogrel (PLAVIX) 75 MG tablet, TAKE 1 TABLET BY MOUTH DAILY, Disp: 90 tablet, Rfl: 3    lisinopril (PRINIVIL,ZESTRIL) 40 MG tablet, TAKE 1 TABLET BY MOUTH DAILY, Disp: 90 tablet, Rfl: 3    Problem List:  Patient Active Problem List   Diagnosis    Atherosclerosis of coronary artery bypass graft    Bilateral carotid artery stenosis    Coronary artery disease involving native coronary artery of native heart without angina pectoris    Mixed hyperlipidemia    PAD (peripheral artery disease)    Sick sinus syndrome    Presence of cardiac pacemaker    Essential hypertension    Sprain of left knee    Tobacco use     Past Medical History:  Past  Medical History:   Diagnosis Date    AK (actinic keratosis)     multiple    Arthritis     Bilateral carotid artery stenosis 4/30/2012    CAD (coronary artery disease)     Carotid artery stenosis     Coronary artery disease involving native coronary artery of native heart without angina pectoris 4/30/2012    PCI to LAD 7/2018 CABG 1997     Hyperlipemia     Hypertension     Mixed hyperlipidemia 8/27/2019    Non-ST elevation myocardial infarction (NSTEMI)     2015    Peripheral vascular disease     Pre-diabetes     Presence of cardiac pacemaker 8/28/2019    BS PM 3/2012    Pulmonary nodule 2016    left lung    Sick sinus syndrome 8/27/2019    SSS (sick sinus syndrome)     intermittent AV block     Past Surgical History:  Past Surgical History:   Procedure Laterality Date    CAROTID ENDARTERECTOMY Right 10/2014    CORONARY ANGIOPLASTY  2012    svg to obtuse marginal branch of lcx    CORONARY ANGIOPLASTY WITH STENT PLACEMENT  07/29/2018    pci to mid lad    CORONARY ARTERY BYPASS GRAFT  1997    5 vessels at [facility]/ Dr Armendariz    CORONARY STENT PLACEMENT  03/16/2015    stenting of svg graft to obtuse marginal and peripheral descending artery    FEMORAL POPLITEAL BYPASS  11/2014    ILIAC ARTERY STENT  12/2014    INSERT / REPLACE / REMOVE PACEMAKER      PACEMAKER IMPLANTATION  03/29/2012    [employer]     Social History:  Social History     Socioeconomic History    Marital status:    Tobacco Use    Smoking status: Every Day     Current packs/day: 1.00     Average packs/day: 1 pack/day for 40.0 years (40.0 ttl pk-yrs)     Types: Cigarettes    Smokeless tobacco: Never   Vaping Use    Vaping status: Never Used   Substance and Sexual Activity    Alcohol use: No    Drug use: No    Sexual activity: Defer     Allergies:  No Known Allergies  Immunizations:  Immunization History   Administered Date(s) Administered    Pneumococcal Conjugate 13-Valent (PCV13) 02/07/2017    Tdap 01/05/2020            In-Office  Procedure(s):  No orders to display        ASCVD RIsk Score::  The ASCVD Risk score (Raza CROWELL, et al., 2019) failed to calculate for the following reasons:    Cannot find a previous HDL lab    Cannot find a previous total cholesterol lab    Imaging:    Results for orders placed during the hospital encounter of 01/05/20    XR Knee 4+ View Left    Narrative  DATE OF EXAM:  1/5/2020 7:23 PM    PROCEDURE:  XR KNEE 4+ VW LEFT-    INDICATIONS:  Fall, left knee pain.    COMPARISON:  No Comparisons Available    TECHNIQUE:  Four or more radiologic views of the left knee were obtained.      FINDINGS:  There is no acute fracture or dislocation. There is narrowing and  spurring of the patellofemoral joint and medial compartment consistent  with mild osteoarthritis. Lateral compartment is normal. There is no  knee joint effusion. There are scattered atherosclerotic vascular  calcifications.    Impression  No acute fracture or dislocation.    Electronically Signed By-Ramesh Ruiz On:1/5/2020 7:40 PM  This report was finalized on [id number] by  Ramesh Ruiz, .               Lab Review:   Hospital Outpatient Visit on 08/13/2024   Component Date Value    Upper arterial right arm* 08/13/2024 138     Upper arterial left arm * 08/13/2024 140     RIGHT POST TIBIAL SYS MAX 08/13/2024 137     LEFT POST TIBIAL SYS MAX 08/13/2024 170     RIGHT DORSALIS PEDIS SYS* 08/13/2024 157     LEFT DORSALIS PEDIS SYS * 08/13/2024 130     RIGHT GREAT TOE SYS MAX 08/13/2024 96     LEFT GREAT TOE SYS MAX 08/13/2024 95     RIGHT ZAHRA RATIO 08/13/2024 1.12     LEFT ZAHRA RATIO 08/13/2024 1.21     RIGHT TBI RATIO 08/13/2024 0.69     LEFT TBI RATIO 08/13/2024 0.68    Hospital Outpatient Visit on 08/13/2024   Component Date Value    RIGHT ZAHRA RATIO 08/13/2024 1.12     LEFT ZAHRA RATIO 08/13/2024 1.21     RIGHT HIDDEN GRAFT ALERT 08/13/2024 11.0     CFA Prox PSV-Right 08/13/2024 122.00     DFA Prox PSV-Right 08/13/2024 53.00     Popiteal A Mid PSV-Right 08/13/2024  63.00     Ant Tibial A Mid PSV-Rig* 08/13/2024 59.00     PTA Mid PSV-Right 08/13/2024 78.00     Peroneal Mid PSV-Right 08/13/2024 38.00     Graft 1 Inflow Artery PS* 08/13/2024 122.00     Graft 1 Proximal Anastam* 08/13/2024 125.00     Graft 1 Prox Graft PSV-R* 08/13/2024 57.00     Graft 1 MID Graft PSV-Ri* 08/13/2024 59.00     Graft 1 Distal Graft PSV* 08/13/2024 58.00     Graft 1 Distal Anastamos* 08/13/2024 92.00     Graft 1 Outflow Artery P* 08/13/2024 75.00     CFA Prox PSV-Left 08/13/2024 214.00     DFA Prox PSV-Left 08/13/2024 65.00     Ant Tibial A Mid PSV-Left 08/13/2024 26.00     PTA Mid PSV-Left 08/13/2024 71.00     Peroneal Mid PSV-Left 08/13/2024 37.00     Graft 1 Inflow Artery PS* 08/13/2024 214.00     Graft 1 Proximal Anastam* 08/13/2024 192.00     Graft 1 Prox Graft PSV-L* 08/13/2024 85.00     Graft 1 MID Graft PSV-Le* 08/13/2024 103.00     Graft 1 Distal Graft PSV* 08/13/2024 88.00     Graft 1 Distal Anastamos* 08/13/2024 78.00     Graft 1 Outflow Artery P* 08/13/2024 67.00     LEFT GRAFT ALERT 08/13/2024 33.0    Hospital Outpatient Visit on 08/13/2024   Component Date Value    Prox CCA PSV 08/13/2024 87.0     Prox CCA EDV 08/13/2024 16.2     Dist CCA PSV 08/13/2024 69.6     Dist CCA EDV 08/13/2024 10.6     Prox ICA PSV 08/13/2024 -61.9     Prox ICA EDV 08/13/2024 -17.2     Dist ICA PSV 08/13/2024 -88.6     Dist ICA EDV 08/13/2024 -26.7     Prox ECA PSV 08/13/2024 -123.0     Vertebral A PSV 08/13/2024 69.0     Vertebral A EDV 08/13/2024 14.9     Prox SCLA PSV 08/13/2024 127.0     ICA/CCA ratio 08/13/2024 -1.02     Prox CCA PSV 08/13/2024 127.0     Prox CCA EDV 08/13/2024 15.9     Dist CCA PSV 08/13/2024 -79.2     Dist CCA EDV 08/13/2024 -16.5     Prox ICA PSV 08/13/2024 -145.0     Prox ICA EDV 08/13/2024 -23.5     Dist ICA PSV 08/13/2024 -71.3     Dist ICA EDV 08/13/2024 -14.9     Prox ECA PSV 08/13/2024 -143.0     Vertebral A PSV 08/13/2024 -41.6     Vertebral A EDV 08/13/2024 -8.7     Prox SCLA  PSV 08/13/2024 168.0     ICA/CCA ratio 08/13/2024 -1.14      Recent labs reviewed and interpreted for clinical significance and application            Level of Care:           Oscar Curtis MD  11/11/24  .

## 2024-11-22 RX ORDER — LISINOPRIL 40 MG/1
40 TABLET ORAL DAILY
Qty: 90 TABLET | Refills: 3 | OUTPATIENT
Start: 2024-11-22

## 2024-11-22 NOTE — TELEPHONE ENCOUNTER
[place]  He must make appt  for any refills he has not been here since 2021   and has never seen this Dr. Carrera

## 2024-11-27 ENCOUNTER — PREP FOR SURGERY (OUTPATIENT)
Dept: OTHER | Facility: HOSPITAL | Age: 74
End: 2024-11-27
Payer: MEDICARE

## 2024-11-27 ENCOUNTER — OFFICE VISIT (OUTPATIENT)
Dept: CARDIOLOGY | Facility: CLINIC | Age: 74
End: 2024-11-27
Payer: MEDICARE

## 2024-11-27 VITALS
DIASTOLIC BLOOD PRESSURE: 72 MMHG | WEIGHT: 208.8 LBS | SYSTOLIC BLOOD PRESSURE: 146 MMHG | HEIGHT: 68 IN | BODY MASS INDEX: 31.64 KG/M2

## 2024-11-27 DIAGNOSIS — Z95.0 PRESENCE OF CARDIAC PACEMAKER: ICD-10-CM

## 2024-11-27 DIAGNOSIS — I25.10 CORONARY ARTERY DISEASE INVOLVING NATIVE CORONARY ARTERY OF NATIVE HEART WITHOUT ANGINA PECTORIS: Primary | ICD-10-CM

## 2024-11-27 DIAGNOSIS — I49.5 SICK SINUS SYNDROME: ICD-10-CM

## 2024-11-27 DIAGNOSIS — I45.2 RIGHT BUNDLE BRANCH BLOCK (RBBB) WITH LEFT POSTERIOR FASCICULAR BLOCK (LPFB): ICD-10-CM

## 2024-11-27 DIAGNOSIS — I49.5 SICK SINUS SYNDROME: Primary | ICD-10-CM

## 2024-11-27 NOTE — PROGRESS NOTES
HP      Name: Aashish Freeman ADMIT: (Not on file)   : 1950  PCP: Carlitos Carrera MD    MRN: [id number] LOS: 0 days   AGE/SEX: 74 y.o. male  ROOM: Room/bed info not found     No chief complaint on file.      Subjective        History of present illness  Aashish Freeman is a 74-year-old male patient who has coronary artery disease, previous bypass, has a dual-chamber pacemaker for sick sinus syndrome, his right atrial lead is nonfunctional and the device is set to VVI.  Battery is at [facility] and is here today to consider generator change out and possible lead replacement.    Past Medical History:   Diagnosis Date    AK (actinic keratosis)     multiple    Arthritis     Bilateral carotid artery stenosis 2012    CAD (coronary artery disease)     Carotid artery stenosis     Coronary artery disease involving native coronary artery of native heart without angina pectoris 2012    PCI to LAD 2018 CABG      Hyperlipemia     Hypertension     Mixed hyperlipidemia 2019    Non-ST elevation myocardial infarction (NSTEMI)     2015    Peripheral vascular disease     Pre-diabetes     Presence of cardiac pacemaker 2019    BS PM 3/2012    Pulmonary nodule 2016    left lung    Sick sinus syndrome 2019    SSS (sick sinus syndrome)     intermittent AV block     Past Surgical History:   Procedure Laterality Date    CAROTID ENDARTERECTOMY Right 10/2014    CORONARY ANGIOPLASTY      svg to obtuse marginal branch of lcx    CORONARY ANGIOPLASTY WITH STENT PLACEMENT  2018    pci to mid lad    CORONARY ARTERY BYPASS GRAFT  1997    5 vessels at [facility]/ Dr Armendariz    CORONARY STENT PLACEMENT  2015    stenting of svg graft to obtuse marginal and peripheral descending artery    FEMORAL POPLITEAL BYPASS  2014    ILIAC ARTERY STENT  2014    INSERT / REPLACE / REMOVE PACEMAKER      PACEMAKER IMPLANTATION  2012    [employer]     Family History   Problem Relation Age of Onset     Heart disease Father      Social History     Tobacco Use    Smoking status: Every Day     Current packs/day: 1.00     Average packs/day: 1 pack/day for 40.0 years (40.0 ttl pk-yrs)     Types: Cigarettes     Passive exposure: Past    Smokeless tobacco: Never   Vaping Use    Vaping status: Never Used   Substance Use Topics    Alcohol use: No    Drug use: No     (Not in a hospital admission)    Allergies:  Patient has no known allergies.    Review of systems    Constitutional: Negative.    Respiratory and cardiovascular: As detailed in HPI section.  Gastrointestinal: Negative for constipation, nausea and vomiting negative for abdominal distention, abdominal pain and diarrhea.   Genitourinary: Negative for difficulty urinating and flank pain.   Musculoskeletal: Negative for arthralgias, joint swelling and myalgias.   Skin: Negative for color change, rash and wound.   Neurological: Negative for dizziness, syncope, weakness and headaches.   Hematological: Negative for adenopathy.   Psychiatric/Behavioral: Negative for confusion.   All other systems reviewed and are negative.    Physical Exam  VITALS REVIEWED    General:      well developed, in no acute distress.    Head:      normocephalic and atraumatic.    Eyes:      PERRL/EOM intact, conjunctiva and sclera clear with out nystagmus.    Neck:      no masses, thyromegaly,  trachea central with normal respiratory effort and PMI displaced laterally  Lungs:      Clear to auscultation bilaterally  Heart:       Regular rate and rhythm  Msk:      no deformity or scoliosis noted of thoracic or lumbar spine.    Pulses:      pulses normal in all 4 extremities.    Extremities:       No lower extremity edema  Neurologic:      no focal deficits.   alert oriented x3  Skin:      intact without lesions or rashes.    Psych:      alert and cooperative; normal mood and affect; normal attention span and concentration.      Result Review :               Pertinent cardiac workup    EKG  4/30/2024 sinus rhythm with first-degree AV block, right bundle branch block and left posterior fascicular block      Procedures        Assessment and Plan      Aashish Freeman is a 74-year-old male patient who has history of coronary artery disease status post bypass, has a dual-chamber pacemaker for sick sinus syndrome and the battery is at [facility].  His atrial lead is nonfunctional and it is turned off and is set VVI.  The other problem however is that he does have conduction system disease with first-degree AV block, right bundle branch block and left posterior fascicular block and obviously he is at high risk for complete heart block.  For that reason I think that he will need not only a new atrial lead but also preferably a CS lead as well to avoid RV pacing and subsequent cardiomyopathy should he develop complete heart block due to aforementioned conduction system disease.  I did inform patient that the pacemaker system will not be MRI compatible because we will have to abandon the right atrial lead unless he wants me to send him for extraction.  Patient is okay with leaving the old atrial lead and the heart.      Diagnoses and all orders for this visit:    1. Coronary artery disease involving native coronary artery of native heart without angina pectoris (Primary)  Overview:  PCI to LAD 7/2018  5 V CABG 1997        2. Sick sinus syndrome    3. Presence of cardiac pacemaker  Overview:  BS PM 3/2012      4. 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  Patient was given instructions and counseling regarding his condition or for health maintenance advice. Please see specific information pulled into the AVS if appropriate.       Electronically signed by Cassie Agudelo MD, 11/27/24, 10:20 AM EST.

## 2025-01-14 ENCOUNTER — LAB (OUTPATIENT)
Dept: LAB | Facility: HOSPITAL | Age: 75
End: 2025-01-14
Payer: MEDICARE

## 2025-01-14 DIAGNOSIS — I49.5 SICK SINUS SYNDROME: ICD-10-CM

## 2025-01-14 LAB
ALBUMIN SERPL-MCNC: 4.4 G/DL (ref 3.5–5.2)
ALBUMIN/GLOB SERPL: 1.5 G/DL
ALP SERPL-CCNC: 103 U/L (ref 39–117)
ALT SERPL W P-5'-P-CCNC: 25 U/L (ref 1–41)
ANION GAP SERPL CALCULATED.3IONS-SCNC: 10 MMOL/L (ref 5–15)
AST SERPL-CCNC: 20 U/L (ref 1–40)
BASOPHILS # BLD AUTO: 0.03 10*3/MM3 (ref 0–0.2)
BASOPHILS NFR BLD AUTO: 0.5 % (ref 0–1.5)
BILIRUB SERPL-MCNC: 0.6 MG/DL (ref 0–1.2)
BUN SERPL-MCNC: 13 MG/DL (ref 8–23)
BUN/CREAT SERPL: 11.3 (ref 7–25)
CALCIUM SPEC-SCNC: 9.6 MG/DL (ref 8.6–10.5)
CHLORIDE SERPL-SCNC: 106 MMOL/L (ref 98–107)
CO2 SERPL-SCNC: 26 MMOL/L (ref 22–29)
CREAT SERPL-MCNC: 1.15 MG/DL (ref 0.76–1.27)
DEPRECATED RDW RBC AUTO: 41.2 FL (ref 37–54)
EGFRCR SERPLBLD CKD-EPI 2021: 66 ML/MIN/1.73
EOSINOPHIL # BLD AUTO: 0.08 10*3/MM3 (ref 0–0.4)
EOSINOPHIL NFR BLD AUTO: 1.3 % (ref 0.3–6.2)
ERYTHROCYTE [DISTWIDTH] IN BLOOD BY AUTOMATED COUNT: 12.8 % (ref 12.3–15.4)
GLOBULIN UR ELPH-MCNC: 2.9 GM/DL
GLUCOSE SERPL-MCNC: 88 MG/DL (ref 65–99)
HCT VFR BLD AUTO: 52.8 % (ref 37.5–51)
HGB BLD-MCNC: 18.6 G/DL (ref 13–17.7)
IMM GRANULOCYTES # BLD AUTO: 0.03 10*3/MM3 (ref 0–0.05)
IMM GRANULOCYTES NFR BLD AUTO: 0.5 % (ref 0–0.5)
INR PPP: 1.03 (ref 0.9–1.1)
LYMPHOCYTES # BLD AUTO: 1.6 10*3/MM3 (ref 0.7–3.1)
LYMPHOCYTES NFR BLD AUTO: 26.4 % (ref 19.6–45.3)
MAGNESIUM SERPL-MCNC: 2.3 MG/DL (ref 1.6–2.4)
MCH RBC QN AUTO: 31.2 PG (ref 26.6–33)
MCHC RBC AUTO-ENTMCNC: 35.2 G/DL (ref 31.5–35.7)
MCV RBC AUTO: 88.6 FL (ref 79–97)
MONOCYTES # BLD AUTO: 0.99 10*3/MM3 (ref 0.1–0.9)
MONOCYTES NFR BLD AUTO: 16.3 % (ref 5–12)
NEUTROPHILS NFR BLD AUTO: 3.33 10*3/MM3 (ref 1.7–7)
NEUTROPHILS NFR BLD AUTO: 55 % (ref 42.7–76)
NRBC BLD AUTO-RTO: 0 /100 WBC (ref 0–0.2)
PLATELET # BLD AUTO: 177 10*3/MM3 (ref 140–450)
PMV BLD AUTO: 11.9 FL (ref 6–12)
POTASSIUM SERPL-SCNC: 5.4 MMOL/L (ref 3.5–5.2)
PROT SERPL-MCNC: 7.3 G/DL (ref 6–8.5)
PROTHROMBIN TIME: 13.6 SECONDS (ref 11.7–14.2)
RBC # BLD AUTO: 5.96 10*6/MM3 (ref 4.14–5.8)
SODIUM SERPL-SCNC: 142 MMOL/L (ref 136–145)
WBC NRBC COR # BLD AUTO: 6.06 10*3/MM3 (ref 3.4–10.8)

## 2025-01-14 PROCEDURE — 85025 COMPLETE CBC W/AUTO DIFF WBC: CPT

## 2025-01-14 PROCEDURE — 36415 COLL VENOUS BLD VENIPUNCTURE: CPT

## 2025-01-14 PROCEDURE — 83735 ASSAY OF MAGNESIUM: CPT

## 2025-01-14 PROCEDURE — 85610 PROTHROMBIN TIME: CPT

## 2025-01-14 PROCEDURE — 80053 COMPREHEN METABOLIC PANEL: CPT

## 2025-01-15 ENCOUNTER — ANESTHESIA EVENT (OUTPATIENT)
Dept: CARDIOLOGY | Facility: HOSPITAL | Age: 75
End: 2025-01-15
Payer: MEDICARE

## 2025-01-15 NOTE — ANESTHESIA PREPROCEDURE EVALUATION
Anesthesia Evaluation     NPO Solid Status: > 8 hours  NPO Liquid Status: > 8 hours           Airway   Mallampati: I  TM distance: >3 FB  Neck ROM: full  No difficulty expected  Dental - normal exam     Pulmonary - normal exam   (+) a smoker Current, Abstained day of surgery,    ROS comment: remy Freeman is a 74-year-old male patient who has history of coronary artery disease status post bypass, has a dual-chamber pacemaker for sick sinus syndrome and the battery is at [facility].  His atrial lead is nonfunctional and it is turned off and is set VVI.  The other problem however is that he does have conduction system disease with first-degree AV block, right bundle branch block and left posterior fascicular block and obviously he is at high risk for complete heart block.  For that reason I think that he will need not only a new atrial lead but also preferably a CS lead as well to avoid RV pacing and subsequent cardiomyopathy should he develop complete heart block due to aforementioned conduction system disease.  DR CHEM  Cardiovascular - normal exam    (+) hypertension, past MI , CAD, CABG, dysrhythmias, PVD, hyperlipidemia,  carotid artery disease      Neuro/Psych  GI/Hepatic/Renal/Endo      Musculoskeletal     Abdominal  - normal exam    Bowel sounds: normal.   Substance History      OB/GYN          Other   arthritis,     ROS/Med Hx Other: R CAROTID NORMAL/L CAROTID LESS THAN 50 2023  PCI LAD 2018  CABG X5 1997  K 5.4 1/14 REPEAT DOS  PLAVIX 72hrs      Phys Exam Other: TEETH POOR CONDITION MANY BROKEN/CHIPPED. NONE LOOSE PER PATIENT. ADVISED POSSIBLE LOOSENING OR LOSS            Anesthesia Plan    ASA 3     MAC   total IV anesthesia  intravenous induction     Anesthetic plan, risks, benefits, and alternatives have been provided, discussed and informed consent has been obtained with: patient.  Pre-procedure education provided  Plan discussed with CRNA.    CODE STATUS:

## 2025-01-16 ENCOUNTER — HOSPITAL ENCOUNTER (OUTPATIENT)
Facility: HOSPITAL | Age: 75
Discharge: HOME | End: 2025-01-17
Attending: INTERNAL MEDICINE | Admitting: INTERNAL MEDICINE
Payer: MEDICARE

## 2025-01-16 ENCOUNTER — APPOINTMENT (OUTPATIENT)
Dept: GENERAL RADIOLOGY | Facility: HOSPITAL | Age: 75
End: 2025-01-16
Payer: MEDICARE

## 2025-01-16 ENCOUNTER — ANESTHESIA (OUTPATIENT)
Dept: CARDIOLOGY | Facility: HOSPITAL | Age: 75
End: 2025-01-16
Payer: MEDICARE

## 2025-01-16 DIAGNOSIS — I49.5 SICK SINUS SYNDROME: ICD-10-CM

## 2025-01-16 LAB
ANION GAP SERPL CALCULATED.3IONS-SCNC: 9.9 MMOL/L (ref 5–15)
BUN SERPL-MCNC: 12 MG/DL (ref 8–23)
BUN/CREAT SERPL: 11.4 (ref 7–25)
CALCIUM SPEC-SCNC: 9.3 MG/DL (ref 8.6–10.5)
CHLORIDE SERPL-SCNC: 108 MMOL/L (ref 98–107)
CO2 SERPL-SCNC: 25.1 MMOL/L (ref 22–29)
CREAT SERPL-MCNC: 1.05 MG/DL (ref 0.76–1.27)
EGFRCR SERPLBLD CKD-EPI 2021: 73.6 ML/MIN/1.73
GLUCOSE SERPL-MCNC: 100 MG/DL (ref 65–99)
POTASSIUM SERPL-SCNC: 3.9 MMOL/L (ref 3.5–5.2)
SODIUM SERPL-SCNC: 143 MMOL/L (ref 136–145)

## 2025-01-16 PROCEDURE — 80048 BASIC METABOLIC PNL TOTAL CA: CPT | Performed by: INTERNAL MEDICINE

## 2025-01-16 PROCEDURE — 25010000002 LIDOCAINE-EPINEPHRINE 2 %-1:100000 SOLUTION: Performed by: INTERNAL MEDICINE

## 2025-01-16 PROCEDURE — A9270 NON-COVERED ITEM OR SERVICE: HCPCS | Performed by: INTERNAL MEDICINE

## 2025-01-16 PROCEDURE — 25510000001 IOPAMIDOL PER 1 ML: Performed by: INTERNAL MEDICINE

## 2025-01-16 PROCEDURE — 33225 L VENTRIC PACING LEAD ADD-ON: CPT | Performed by: INTERNAL MEDICINE

## 2025-01-16 PROCEDURE — C1894 INTRO/SHEATH, NON-LASER: HCPCS | Performed by: INTERNAL MEDICINE

## 2025-01-16 PROCEDURE — 25810000003 SODIUM CHLORIDE 0.9 % SOLUTION: Performed by: NURSE ANESTHETIST, CERTIFIED REGISTERED

## 2025-01-16 PROCEDURE — C1785 PMKR, DUAL, RATE-RESP: HCPCS | Performed by: INTERNAL MEDICINE

## 2025-01-16 PROCEDURE — 33233 REMOVAL OF PM GENERATOR: CPT | Performed by: INTERNAL MEDICINE

## 2025-01-16 PROCEDURE — 71045 X-RAY EXAM CHEST 1 VIEW: CPT

## 2025-01-16 PROCEDURE — C1887 CATHETER, GUIDING: HCPCS | Performed by: INTERNAL MEDICINE

## 2025-01-16 PROCEDURE — C1769 GUIDE WIRE: HCPCS | Performed by: INTERNAL MEDICINE

## 2025-01-16 PROCEDURE — 25010000002 CEFAZOLIN PER 500 MG: Performed by: INTERNAL MEDICINE

## 2025-01-16 PROCEDURE — 63710000001 ATORVASTATIN 40 MG TABLET: Performed by: INTERNAL MEDICINE

## 2025-01-16 PROCEDURE — 63710000001 ATENOLOL 25 MG TABLET: Performed by: INTERNAL MEDICINE

## 2025-01-16 PROCEDURE — 63710000001 AMLODIPINE 5 MG TABLET: Performed by: INTERNAL MEDICINE

## 2025-01-16 PROCEDURE — S0260 H&P FOR SURGERY: HCPCS | Performed by: INTERNAL MEDICINE

## 2025-01-16 PROCEDURE — 25010000002 PROPOFOL 1000 MG/100ML EMULSION: Performed by: NURSE ANESTHETIST, CERTIFIED REGISTERED

## 2025-01-16 PROCEDURE — C1900 LEAD, CORONARY VENOUS: HCPCS | Performed by: INTERNAL MEDICINE

## 2025-01-16 PROCEDURE — 33206 INSERT HEART PM ATRIAL: CPT | Performed by: INTERNAL MEDICINE

## 2025-01-16 PROCEDURE — 63710000001 LISINOPRIL 20 MG TABLET: Performed by: INTERNAL MEDICINE

## 2025-01-16 PROCEDURE — 25010000002 FENTANYL CITRATE (PF) 100 MCG/2ML SOLUTION: Performed by: NURSE ANESTHETIST, CERTIFIED REGISTERED

## 2025-01-16 PROCEDURE — C1898 LEAD, PMKR, OTHER THAN TRANS: HCPCS | Performed by: INTERNAL MEDICINE

## 2025-01-16 DEVICE — PACE/SENSE LEAD
Type: IMPLANTABLE DEVICE | Site: HEART | Status: FUNCTIONAL
Brand: ACUITY™ X4 STRAIGHT

## 2025-01-16 DEVICE — PACE/SENSE LEAD
Type: IMPLANTABLE DEVICE | Site: HEART | Status: FUNCTIONAL
Brand: INGEVITY™+

## 2025-01-16 DEVICE — PACEMAKER
Type: IMPLANTABLE DEVICE | Site: HEART | Status: FUNCTIONAL
Brand: ACCOLADE™ MRI EL DR

## 2025-01-16 RX ORDER — HYDRALAZINE HYDROCHLORIDE 20 MG/ML
5 INJECTION INTRAMUSCULAR; INTRAVENOUS
Status: DISCONTINUED | OUTPATIENT
Start: 2025-01-16 | End: 2025-01-17 | Stop reason: HOSPADM

## 2025-01-16 RX ORDER — ATENOLOL 25 MG/1
25 TABLET ORAL DAILY
COMMUNITY
End: 2025-01-20

## 2025-01-16 RX ORDER — ACETAMINOPHEN 500 MG
500 TABLET ORAL EVERY 6 HOURS PRN
Status: DISCONTINUED | OUTPATIENT
Start: 2025-01-16 | End: 2025-01-16 | Stop reason: SDUPTHER

## 2025-01-16 RX ORDER — HYDROMORPHONE HYDROCHLORIDE 1 MG/ML
0.5 INJECTION, SOLUTION INTRAMUSCULAR; INTRAVENOUS; SUBCUTANEOUS
Status: DISCONTINUED | OUTPATIENT
Start: 2025-01-16 | End: 2025-01-17 | Stop reason: HOSPADM

## 2025-01-16 RX ORDER — ATORVASTATIN CALCIUM 40 MG/1
40 TABLET, FILM COATED ORAL DAILY
COMMUNITY

## 2025-01-16 RX ORDER — IPRATROPIUM BROMIDE AND ALBUTEROL SULFATE 2.5; .5 MG/3ML; MG/3ML
3 SOLUTION RESPIRATORY (INHALATION) ONCE AS NEEDED
Status: DISCONTINUED | OUTPATIENT
Start: 2025-01-16 | End: 2025-01-17 | Stop reason: HOSPADM

## 2025-01-16 RX ORDER — IOPAMIDOL 755 MG/ML
INJECTION, SOLUTION INTRAVASCULAR
Status: DISCONTINUED | OUTPATIENT
Start: 2025-01-16 | End: 2025-01-16 | Stop reason: HOSPADM

## 2025-01-16 RX ORDER — NALOXONE HCL 0.4 MG/ML
0.4 VIAL (ML) INJECTION AS NEEDED
Status: ACTIVE | OUTPATIENT
Start: 2025-01-16 | End: 2025-01-17

## 2025-01-16 RX ORDER — LABETALOL HYDROCHLORIDE 5 MG/ML
5 INJECTION, SOLUTION INTRAVENOUS
Status: DISCONTINUED | OUTPATIENT
Start: 2025-01-16 | End: 2025-01-17 | Stop reason: HOSPADM

## 2025-01-16 RX ORDER — ATORVASTATIN CALCIUM 40 MG/1
40 TABLET, FILM COATED ORAL DAILY
Status: DISCONTINUED | OUTPATIENT
Start: 2025-01-16 | End: 2025-01-17 | Stop reason: HOSPADM

## 2025-01-16 RX ORDER — OXYCODONE HYDROCHLORIDE 5 MG/1
5 TABLET ORAL ONCE AS NEEDED
Status: DISCONTINUED | OUTPATIENT
Start: 2025-01-16 | End: 2025-01-17 | Stop reason: HOSPADM

## 2025-01-16 RX ORDER — ACETAMINOPHEN 650 MG/1
650 SUPPOSITORY RECTAL EVERY 4 HOURS PRN
Status: DISCONTINUED | OUTPATIENT
Start: 2025-01-16 | End: 2025-01-17 | Stop reason: HOSPADM

## 2025-01-16 RX ORDER — HYDROCODONE BITARTRATE AND ACETAMINOPHEN 5; 325 MG/1; MG/1
1 TABLET ORAL EVERY 6 HOURS PRN
Status: DISCONTINUED | OUTPATIENT
Start: 2025-01-16 | End: 2025-01-17 | Stop reason: HOSPADM

## 2025-01-16 RX ORDER — PROPOFOL 10 MG/ML
INJECTION, EMULSION INTRAVENOUS CONTINUOUS PRN
Status: DISCONTINUED | OUTPATIENT
Start: 2025-01-16 | End: 2025-01-16 | Stop reason: SURG

## 2025-01-16 RX ORDER — ACETAMINOPHEN 160 MG/5ML
650 SOLUTION ORAL EVERY 4 HOURS PRN
Status: DISCONTINUED | OUTPATIENT
Start: 2025-01-16 | End: 2025-01-17 | Stop reason: HOSPADM

## 2025-01-16 RX ORDER — ATENOLOL 25 MG/1
25 TABLET ORAL DAILY
Status: DISCONTINUED | OUTPATIENT
Start: 2025-01-16 | End: 2025-01-17 | Stop reason: HOSPADM

## 2025-01-16 RX ORDER — SODIUM CHLORIDE 9 MG/ML
INJECTION, SOLUTION INTRAVENOUS CONTINUOUS PRN
Status: DISCONTINUED | OUTPATIENT
Start: 2025-01-16 | End: 2025-01-16 | Stop reason: SURG

## 2025-01-16 RX ORDER — CLOPIDOGREL BISULFATE 75 MG/1
75 TABLET ORAL DAILY
COMMUNITY
End: 2025-01-20

## 2025-01-16 RX ORDER — DIPHENHYDRAMINE HYDROCHLORIDE 50 MG/ML
12.5 INJECTION INTRAMUSCULAR; INTRAVENOUS ONCE AS NEEDED
Status: DISCONTINUED | OUTPATIENT
Start: 2025-01-16 | End: 2025-01-17 | Stop reason: HOSPADM

## 2025-01-16 RX ORDER — DIPHENHYDRAMINE HYDROCHLORIDE 50 MG/ML
12.5 INJECTION INTRAMUSCULAR; INTRAVENOUS
Status: DISCONTINUED | OUTPATIENT
Start: 2025-01-16 | End: 2025-01-17 | Stop reason: HOSPADM

## 2025-01-16 RX ORDER — PHENYLEPHRINE HCL IN 0.9% NACL 1 MG/10 ML
SYRINGE (ML) INTRAVENOUS AS NEEDED
Status: DISCONTINUED | OUTPATIENT
Start: 2025-01-16 | End: 2025-01-16 | Stop reason: SURG

## 2025-01-16 RX ORDER — EPHEDRINE SULFATE 5 MG/ML
INJECTION INTRAVENOUS AS NEEDED
Status: DISCONTINUED | OUTPATIENT
Start: 2025-01-16 | End: 2025-01-16 | Stop reason: SURG

## 2025-01-16 RX ORDER — LISINOPRIL 20 MG/1
40 TABLET ORAL DAILY
Status: DISCONTINUED | OUTPATIENT
Start: 2025-01-16 | End: 2025-01-17 | Stop reason: HOSPADM

## 2025-01-16 RX ORDER — FLUMAZENIL 0.1 MG/ML
0.2 INJECTION INTRAVENOUS AS NEEDED
Status: ACTIVE | OUTPATIENT
Start: 2025-01-16 | End: 2025-01-17

## 2025-01-16 RX ORDER — ACETAMINOPHEN 325 MG/1
650 TABLET ORAL EVERY 4 HOURS PRN
Status: DISCONTINUED | OUTPATIENT
Start: 2025-01-16 | End: 2025-01-17 | Stop reason: HOSPADM

## 2025-01-16 RX ORDER — EPHEDRINE SULFATE 5 MG/ML
5 INJECTION INTRAVENOUS ONCE AS NEEDED
Status: DISCONTINUED | OUTPATIENT
Start: 2025-01-16 | End: 2025-01-17 | Stop reason: HOSPADM

## 2025-01-16 RX ORDER — LIDOCAINE HYDROCHLORIDE AND EPINEPHRINE BITARTRATE 20; .01 MG/ML; MG/ML
INJECTION, SOLUTION SUBCUTANEOUS
Status: DISCONTINUED | OUTPATIENT
Start: 2025-01-16 | End: 2025-01-16 | Stop reason: HOSPADM

## 2025-01-16 RX ORDER — AMLODIPINE BESYLATE 5 MG/1
5 TABLET ORAL DAILY
Status: DISCONTINUED | OUTPATIENT
Start: 2025-01-16 | End: 2025-01-17 | Stop reason: HOSPADM

## 2025-01-16 RX ORDER — LISINOPRIL 40 MG/1
40 TABLET ORAL DAILY
COMMUNITY

## 2025-01-16 RX ORDER — OXYCODONE HYDROCHLORIDE 5 MG/1
10 TABLET ORAL EVERY 4 HOURS PRN
Status: DISCONTINUED | OUTPATIENT
Start: 2025-01-16 | End: 2025-01-17 | Stop reason: HOSPADM

## 2025-01-16 RX ORDER — ONDANSETRON 2 MG/ML
4 INJECTION INTRAMUSCULAR; INTRAVENOUS ONCE AS NEEDED
Status: DISCONTINUED | OUTPATIENT
Start: 2025-01-16 | End: 2025-01-17 | Stop reason: HOSPADM

## 2025-01-16 RX ORDER — AMLODIPINE BESYLATE 5 MG/1
5 TABLET ORAL DAILY
COMMUNITY
End: 2025-01-20

## 2025-01-16 RX ORDER — FENTANYL CITRATE 50 UG/ML
INJECTION, SOLUTION INTRAMUSCULAR; INTRAVENOUS AS NEEDED
Status: DISCONTINUED | OUTPATIENT
Start: 2025-01-16 | End: 2025-01-16 | Stop reason: SURG

## 2025-01-16 RX ADMIN — Medication 50 MCG: at 09:06

## 2025-01-16 RX ADMIN — Medication 100 MCG: at 09:12

## 2025-01-16 RX ADMIN — Medication 100 MCG: at 08:55

## 2025-01-16 RX ADMIN — ATORVASTATIN CALCIUM 40 MG: 40 TABLET, FILM COATED ORAL at 15:02

## 2025-01-16 RX ADMIN — FENTANYL CITRATE 25 MCG: 50 INJECTION, SOLUTION INTRAMUSCULAR; INTRAVENOUS at 08:30

## 2025-01-16 RX ADMIN — SODIUM CHLORIDE: 9 INJECTION, SOLUTION INTRAVENOUS at 08:08

## 2025-01-16 RX ADMIN — CEFAZOLIN 2 G: 2 INJECTION, POWDER, FOR SOLUTION INTRAMUSCULAR; INTRAVENOUS at 08:21

## 2025-01-16 RX ADMIN — AMLODIPINE BESYLATE 5 MG: 5 TABLET ORAL at 15:02

## 2025-01-16 RX ADMIN — LISINOPRIL 40 MG: 20 TABLET ORAL at 15:03

## 2025-01-16 RX ADMIN — PROPOFOL INJECTABLE EMULSION 75 MCG/KG/MIN: 10 INJECTION, EMULSION INTRAVENOUS at 08:17

## 2025-01-16 RX ADMIN — CEFAZOLIN 2000 MG: 2 INJECTION, POWDER, FOR SOLUTION INTRAMUSCULAR; INTRAVENOUS at 15:03

## 2025-01-16 RX ADMIN — FENTANYL CITRATE 25 MCG: 50 INJECTION, SOLUTION INTRAMUSCULAR; INTRAVENOUS at 10:38

## 2025-01-16 RX ADMIN — EPHEDRINE SULFATE 5 MG: 5 INJECTION INTRAVENOUS at 09:06

## 2025-01-16 RX ADMIN — PROPOFOL INJECTABLE EMULSION 50 MG: 10 INJECTION, EMULSION INTRAVENOUS at 08:18

## 2025-01-16 RX ADMIN — CEFAZOLIN 2000 MG: 2 INJECTION, POWDER, FOR SOLUTION INTRAMUSCULAR; INTRAVENOUS at 23:45

## 2025-01-16 RX ADMIN — Medication 100 MCG: at 08:37

## 2025-01-16 RX ADMIN — EPHEDRINE SULFATE 5 MG: 5 INJECTION INTRAVENOUS at 09:02

## 2025-01-16 RX ADMIN — ATENOLOL 25 MG: 25 TABLET ORAL at 15:02

## 2025-01-16 RX ADMIN — Medication 100 MCG: at 08:45

## 2025-01-16 RX ADMIN — Medication 100 MCG: at 08:35

## 2025-01-16 RX ADMIN — EPHEDRINE SULFATE 5 MG: 5 INJECTION INTRAVENOUS at 09:12

## 2025-01-16 RX ADMIN — PROPOFOL INJECTABLE EMULSION 30 MG: 10 INJECTION, EMULSION INTRAVENOUS at 09:59

## 2025-01-16 NOTE — CASE MANAGEMENT/SOCIAL WORK
Discharge Planning Assessment   Humberto     Patient Name: Aashish Freeman  MRN: [id number]  Today's Date: 1/16/2025    Admit Date: 1/16/2025    Plan: DC Plan: Anticipate routine home alone. Son will provide transport.   Discharge Needs Assessment       Row Name 01/16/25 1518       Living Environment    People in Home alone    Current Living Arrangements home    Potentially Unsafe Housing Conditions none    In the past 12 months has the electric, gas, oil, or water company threatened to shut off services in your home? No    Primary Care Provided by self    Provides Primary Care For no one    Family Caregiver if Needed child(edward), adult    Family Caregiver Names Son Alexis Freeman    Quality of Family Relationships helpful;involved;supportive    Able to Return to Prior Arrangements yes       Resource/Environmental Concerns    Resource/Environmental Concerns none    Transportation Concerns none       Transportation Needs    In the past 12 months, has lack of transportation kept you from medical appointments or from getting medications? no    In the past 12 months, has lack of transportation kept you from meetings, work, or from getting things needed for daily living? No       Food Insecurity    Within the past 12 months, you worried that your food would run out before you got the money to buy more. Never true    Within the past 12 months, the food you bought just didn't last and you didn't have money to get more. Never true       Transition Planning    Patient/Family Anticipates Transition to home    Patient/Family Anticipated Services at Transition none    Transportation Anticipated car, drives self;family or friend will provide       Discharge Needs Assessment    Readmission Within the Last 30 Days no previous admission in last 30 days    Equipment Currently Used at Home none    Concerns to be Addressed no discharge needs identified;denies needs/concerns at this time    Do you want help finding or keeping work or a job? I  do not need or want help    Do you want help with school or training? For example, starting or completing job training or getting a high school diploma, GED or equivalent No    Anticipated Changes Related to Illness none    Equipment Needed After Discharge none    Provided Post Acute Provider List? N/A    Provided Post Acute Provider Quality & Resource List? N/A    Current Discharge Risk lives alone                   Discharge Plan       Row Name 01/16/25 3652       Plan    Plan DC Plan: Anticipate routine home alone. Son will provide transport.    Patient/Family in Agreement with Plan yes    Provided Post Acute Provider List? N/A    Provided Post Acute Provider Quality & Resource List? N/A    Plan Comments CM spoke with patient at bedside to discuss admission assessment and discharge planning. Patient confirms PCP and pharmacy. Patient is already enrolled in meds to bed program. Patient denies any difficulty affording medications or food at this time. Patient denies any additional needs for services or DME at this time. No DME use at home. Patient has arm immobilizer in place post procedure. Patient reports independent with ADL's and drives at [place]. Patient son will provide transportation when ready for DC. Patient had PPM placed today. Anticipating DC home tomorrow morning.CM will continue to follow for any additional needs and adjust DC plan accordingly. DC Barriers: Post procedure montoring and IV abx.                Demographic Summary       Row Name 01/16/25 4661       General Information    Admission Type other (see comments)  Outpatient    Arrived From home;other (see comments)  cath lab    Referral Source admission list    Reason for Consult discharge planning    Preferred Language English       Contact Information    Permission Granted to Share Info With                    Functional Status       Row Name 01/16/25 1510       Functional Status    Usual Activity Tolerance excellent    Current  Activity Tolerance good       Physical Activity    On average, how many days per week do you engage in moderate to strenuous exercise (like a brisk walk)? 5 days    On average, how many minutes do you engage in exercise at this level? 120 min    Number of minutes of exercise per week 600       Functional Status, IADL    Medications independent    Meal Preparation independent    Housekeeping independent    Laundry independent    Shopping independent    If for any reason you need help with day-to-day activities such as bathing, preparing meals, shopping, managing finances, etc., do you get the help you need? I don't need any help       Mental Status    General Appearance WDL WDL       Mental Status Summary    Recent Changes in Mental Status/Cognitive Functioning no changes       Employment/    Employment Status retired;, previous service    Current or Previous Occupation not applicable           Current or Previous  Service active duty, past     [employer]                   Cee Serna RN    Office Phone: [phone]  Office Cell:     [phone]

## 2025-01-16 NOTE — NURSING NOTE
Transported patient per stretcher to 3108; did right upper chest pacer site check with CIELO Herman; scant blood to dressing; family at bedside.

## 2025-01-16 NOTE — Clinical Note
A 8 fr sheath was successfully inserted using micropuncture technique into the right subclavian vein.

## 2025-01-16 NOTE — ANESTHESIA POSTPROCEDURE EVALUATION
Patient: Aashish Freeman    Procedure Summary       Date: 01/16/25 Room / Location: [facility] [address] [place] [address] CATH INVASIVE LOCATION    Anesthesia Start: 0808 Anesthesia Stop: 1053    Procedure: Biventricular PACEMAKER Upgrade, [place] - REPS EMAILED Diagnosis:       Sick sinus syndrome      (symptomatic bradycardia, sick sinus syndrome)    Providers: Cassie Agudelo MD Provider: Denver Damian MD    Anesthesia Type: MAC ASA Status: 3            Anesthesia Type: MAC    Vitals  Vitals Value Taken Time   /76 01/16/25 1316   Temp     Pulse 94 01/16/25 1324   Resp 18 01/16/25 1100   SpO2 96 % 01/16/25 1324   Vitals shown include unfiled device data.        Post Anesthesia Care and Evaluation    Patient location during evaluation: PACU  Patient participation: complete - patient participated  Level of consciousness: awake  Pain scale: See nurse's notes for pain score.  Pain management: adequate    Airway patency: patent  Anesthetic complications: No anesthetic complications  PONV Status: none  Cardiovascular status: acceptable  Respiratory status: acceptable and spontaneous ventilation  Hydration status: acceptable    Comments: Patient seen and examined postoperatively; vital signs stable; SpO2 greater than or equal to 90%; cardiopulmonary status stable; nausea/vomiting adequately controlled; pain adequately controlled; no apparent anesthesia complications; patient discharged from anesthesia care when discharge criteria were met

## 2025-01-16 NOTE — ANESTHESIA PROCEDURE NOTES
Airway    General Information and Staff    Patient location during procedure: OR    Additional Comments  POM Mask placed

## 2025-01-16 NOTE — H&P
History of present illness  Aashish Freeman is a 74-year-old male patient who has coronary artery disease, previous bypass, has a dual-chamber pacemaker for sick sinus syndrome, his right atrial lead is nonfunctional and the device is set to VVI.  Battery is at [facility] and is here today to consider generator change out and possible lead replacement.     Medical History        Past Medical History:   Diagnosis Date    AK (actinic keratosis)       multiple    Arthritis      Bilateral carotid artery stenosis 4/30/2012    CAD (coronary artery disease)      Carotid artery stenosis      Coronary artery disease involving native coronary artery of native heart without angina pectoris 4/30/2012     PCI to LAD 7/2018 CABG 1997     Hyperlipemia      Hypertension      Mixed hyperlipidemia 8/27/2019    Non-ST elevation myocardial infarction (NSTEMI)       2015    Peripheral vascular disease      Pre-diabetes      Presence of cardiac pacemaker 8/28/2019     BS PM 3/2012    Pulmonary nodule 2016     left lung    Sick sinus syndrome 8/27/2019    SSS (sick sinus syndrome)       intermittent AV block         Surgical History         Past Surgical History:   Procedure Laterality Date    CAROTID ENDARTERECTOMY Right 10/2014    CORONARY ANGIOPLASTY   2012     svg to obtuse marginal branch of lcx    CORONARY ANGIOPLASTY WITH STENT PLACEMENT   07/29/2018     pci to mid lad    CORONARY ARTERY BYPASS GRAFT   1997     5 vessels at [facility]/ Dr Armendariz    CORONARY STENT PLACEMENT   03/16/2015     stenting of svg graft to obtuse marginal and peripheral descending artery    FEMORAL POPLITEAL BYPASS   11/2014    ILIAC ARTERY STENT   12/2014    INSERT / REPLACE / REMOVE PACEMAKER        PACEMAKER IMPLANTATION   03/29/2012     [place]               Family History   Problem Relation Age of Onset    Heart disease Father        Social History   Social History            Tobacco Use    Smoking status: Every Day       Current packs/day: 1.00      Patient Seen in: [facility] [place]      History   Patient presents with:  Abscess    Stated Complaint: Ongoing Abscess    HPI    47-year-old female with a history of recurrent abscesses/infected cyst on her face returns the IC secondary to   Average packs/day: 1 pack/day for 40.0 years (40.0 ttl pk-yrs)       Types: Cigarettes       Passive exposure: Past    Smokeless tobacco: Never   Vaping Use    Vaping status: Never Used   Substance Use Topics    Alcohol use: No    Drug use: No           Prescriptions Prior to Admission   (Not in a hospital admission)      Allergies:  Patient has no known allergies.     Review of systems     Constitutional: Negative.    Respiratory and cardiovascular: As detailed in HPI section.  Gastrointestinal: Negative for constipation, nausea and vomiting negative for abdominal distention, abdominal pain and diarrhea.   Genitourinary: Negative for difficulty urinating and flank pain.   Musculoskeletal: Negative for arthralgias, joint swelling and myalgias.   Skin: Negative for color change, rash and wound.   Neurological: Negative for dizziness, syncope, weakness and headaches.   Hematological: Negative for adenopathy.   Psychiatric/Behavioral: Negative for confusion.   All other systems reviewed and are negative.     Physical Exam  VITALS REVIEWED     General:      well developed, in no acute distress.    Head:      normocephalic and atraumatic.    Eyes:      PERRL/EOM intact, conjunctiva and sclera clear with out nystagmus.    Neck:      no masses, thyromegaly,  trachea central with normal respiratory effort and PMI displaced laterally  Lungs:      Clear to auscultation bilaterally  Heart:       Regular rate and rhythm  Msk:      no deformity or scoliosis noted of thoracic or lumbar spine.    Pulses:      pulses normal in all 4 extremities.    Extremities:       No lower extremity edema  Neurologic:      no focal deficits.   alert oriented x3  Skin:      intact without lesions or rashes.    Psych:      alert and cooperative; normal mood and affect; normal attention span and concentration.          Result Review  :                    Pertinent cardiac workup     EKG 4/30/2024 sinus rhythm with first-degree AV block, right  PROCEDURE I & D:   A timeout protocol was performed prior to initiating the procedure. The area was prepared and draped in the usual, sterile manner. The site was anesthetized with 2 mL of 2% % lidocaine with epinephrine.  A linear incision along the loc bundle branch block and left posterior fascicular block        Procedures         Assessment  Assessment and Plan       Aashish Freeman is a 74-year-old male patient who has history of coronary artery disease status post bypass, has a dual-chamber pacemaker for sick sinus syndrome and the battery is at [facility].  His atrial lead is nonfunctional and it is turned off and is set VVI.  The other problem however is that he does have conduction system disease with first-degree AV block, right bundle branch block and left posterior fascicular block and obviously he is at high risk for complete heart block.  For that reason I think that he will need not only a new atrial lead but also preferably a CS lead as well to avoid RV pacing and subsequent cardiomyopathy should he develop complete heart block due to aforementioned conduction system disease.  I did inform patient that the pacemaker system will not be MRI compatible because we will have to abandon the right atrial lead unless he wants me to send him for extraction.  Patient is okay with leaving the old atrial lead and the heart.        Diagnoses and all orders for this visit:     1. Coronary artery disease involving native coronary artery of native heart without angina pectoris (Primary)  Overview:  PCI to LAD 7/2018  5 V CABG 1997           2. Sick sinus syndrome     3. Presence of cardiac pacemaker  Overview:  BS PM 3/2012        4. 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  Patient was given instructions and counseling regarding his condition or for health maintenance advice. Please see specific information pulled into the AVS if appropriate.

## 2025-01-16 NOTE — Clinical Note
A 10 fr sheath was successfully inserted using micropuncture technique into the right subclavian vein.

## 2025-01-17 VITALS
SYSTOLIC BLOOD PRESSURE: 139 MMHG | WEIGHT: 206.35 LBS | TEMPERATURE: 98.7 F | BODY MASS INDEX: 31.27 KG/M2 | HEIGHT: 68 IN | DIASTOLIC BLOOD PRESSURE: 65 MMHG | RESPIRATION RATE: 19 BRPM | OXYGEN SATURATION: 93 % | HEART RATE: 60 BPM

## 2025-01-17 PROCEDURE — 63710000001 LISINOPRIL 20 MG TABLET: Performed by: INTERNAL MEDICINE

## 2025-01-17 PROCEDURE — A9270 NON-COVERED ITEM OR SERVICE: HCPCS | Performed by: INTERNAL MEDICINE

## 2025-01-17 PROCEDURE — 63710000001 ATENOLOL 25 MG TABLET: Performed by: INTERNAL MEDICINE

## 2025-01-17 PROCEDURE — 63710000001 ATORVASTATIN 40 MG TABLET: Performed by: INTERNAL MEDICINE

## 2025-01-17 PROCEDURE — 99024 POSTOP FOLLOW-UP VISIT: CPT | Performed by: NURSE PRACTITIONER

## 2025-01-17 PROCEDURE — 63710000001 AMLODIPINE 5 MG TABLET: Performed by: INTERNAL MEDICINE

## 2025-01-17 RX ORDER — CEPHALEXIN 500 MG/1
500 CAPSULE ORAL 2 TIMES DAILY
Qty: 10 CAPSULE | Refills: 0 | Status: SHIPPED | OUTPATIENT
Start: 2025-01-17 | End: 2025-01-22

## 2025-01-17 RX ADMIN — ATENOLOL 25 MG: 25 TABLET ORAL at 10:12

## 2025-01-17 RX ADMIN — AMLODIPINE BESYLATE 5 MG: 5 TABLET ORAL at 10:13

## 2025-01-17 RX ADMIN — LISINOPRIL 40 MG: 20 TABLET ORAL at 10:12

## 2025-01-17 RX ADMIN — ATORVASTATIN CALCIUM 40 MG: 40 TABLET, FILM COATED ORAL at 10:13

## 2025-01-17 NOTE — PLAN OF CARE
Goal Outcome Evaluation:      Patient is hemodynamically stable after pacer placement

## 2025-01-17 NOTE — PLAN OF CARE
Problem: Adult Inpatient Plan of Care  Goal: Absence of Hospital-Acquired Illness or Injury  Intervention: Identify and Manage Fall Risk  Recent Flowsheet Documentation  Taken 1/17/2025 0600 by Stacey Jones RN  Safety Promotion/Fall Prevention: safety round/check completed  Taken 1/17/2025 0400 by Stacey Jones RN  Safety Promotion/Fall Prevention: safety round/check completed  Taken 1/17/2025 0200 by Stacey Jones RN  Safety Promotion/Fall Prevention: safety round/check completed   Goal Outcome Evaluation:      Pt possibly to d/c today. Pt had no complaints through the night. No further orders at this time. Care continues.

## 2025-01-17 NOTE — DISCHARGE SUMMARY
[facility]    Date of Admission: 1/16/2025    Date of Discharge:  1/17/2025    Length of stay:  LOS: 0 days     Admission Diagnosis: sick sinus syndrome, symptomatic bradycardia    Discharge Diagnosis: same status post dual-chamber pacemaker implantation    Presenting Problem/History of Present Illness  Active Hospital Problems    Diagnosis  POA    **Sick sinus syndrome [I49.5]  Unknown      Resolved Hospital Problems   No resolved problems to display.          Hospital Course  Aashish Freeman is a 74 y.o. male presented for an elective dual chamber pacemaker implantation and atrial lead replacement on 1/16/2025. There were no procedural complications, patient was seen and examined this morning, no bleeding or hematoma at surgical incision site.  Device interrogation showed appropriate function and chest x-ray showed good lead positioning.  Patient will be discharged home today in a stable condition with Keflex for 5 days.  We will do a 2-week wound check appointment and then follow up with primary cardiologist, Dr. Curtis.      The discharge process took about 35 minutes during which we discussed about PM monitoring, wound care, medications as well as arm restrictions.  The patient voiced understanding and all his questions were answered to his satisfaction.  .      Past Medical History:   Diagnosis Date    AK (actinic keratosis)     multiple    Arthritis     Bilateral carotid artery stenosis 04/30/2012    CAD (coronary artery disease)     Carotid artery stenosis     Coronary artery disease involving native coronary artery of native heart without angina pectoris 04/30/2012    PCI to LAD 7/2018 CABG 1997     Hyperlipemia     Hypertension     Mixed hyperlipidemia 08/27/2019    Non-ST elevation myocardial infarction (NSTEMI)     2015    Peripheral vascular disease     Pre-diabetes     Presence of cardiac pacemaker 08/28/2019    BS PM 3/2012    Pulmonary nodule 2016    left lung    Sick sinus syndrome 08/27/2019    SSS  (sick sinus syndrome)     intermittent AV block     Past Surgical History:   Procedure Laterality Date    CARDIAC ELECTROPHYSIOLOGY PROCEDURE N/A 1/16/2025    Procedure: Biventricular PACEMAKER Upgrade, [place] - REPS EMAILED;  Surgeon: Cassie Agudelo MD;  Location: [facility] CATH INVASIVE LOCATION;  Service: Cardiovascular;  Laterality: N/A;    CAROTID ENDARTERECTOMY Right 10/2014    CORONARY ANGIOPLASTY  2012    svg to obtuse marginal branch of lcx    CORONARY ANGIOPLASTY WITH STENT PLACEMENT  07/29/2018    pci to mid lad    CORONARY ARTERY BYPASS GRAFT  1997    5 vessels at [facility]/ Dr Armendariz    CORONARY STENT PLACEMENT  03/16/2015    stenting of svg graft to obtuse marginal and peripheral descending artery    FEMORAL POPLITEAL BYPASS  11/2014    ILIAC ARTERY STENT  12/2014    INSERT / REPLACE / REMOVE PACEMAKER      PACEMAKER IMPLANTATION  03/29/2012    [employer]     Social History     Socioeconomic History    Marital status:    Tobacco Use    Smoking status: Every Day     Current packs/day: 1.00     Average packs/day: 1 pack/day for 40.0 years (40.0 ttl pk-yrs)     Types: Cigarettes     Passive exposure: Past    Smokeless tobacco: Never   Vaping Use    Vaping status: Never Used   Substance and Sexual Activity    Alcohol use: No    Drug use: No    Sexual activity: Defer       Procedures Performed  Implantation of new right atrial lead and generator change out, attempted CS lead placement    Consults:   Consulting Physician(s)                     None                Pertinent Test Results:     Lab Results (last 72 hours)       Procedure Component Value Units Date/Time    Basic Metabolic Panel [[id number]]  (Abnormal) Collected: 01/16/25 0647    Specimen: Blood Updated: 01/16/25 0735     Glucose 100 mg/dL      BUN 12 mg/dL      Creatinine 1.05 mg/dL      Sodium 143 mmol/L      Potassium 3.9 mmol/L      Chloride 108 mmol/L      CO2 25.1 mmol/L      Calcium 9.3 mg/dL      BUN/Creatinine Ratio 11.4      Anion Gap 9.9 mmol/L      eGFR 73.6 mL/min/1.73     Narrative:      GFR Categories in Chronic Kidney Disease (CKD)      GFR Category          GFR (mL/min/1.73)    Interpretation  G1                     90 or greater         Normal or high (1)  G2                      60-89                Mild decrease (1)  G3a                   45-59                Mild to moderate decrease  G3b                   30-44                Moderate to severe decrease  G4                    15-29                Severe decrease  G5                    14 or less           Kidney failure          (1)In the absence of evidence of kidney disease, neither GFR category G1 or G2 fulfill the criteria for CKD.    eGFR calculation 2021 CKD-EPI creatinine equation, which does not include race as a factor                Imaging Results (Last 72 Hours)       Procedure Component Value Units Date/Time    XR Chest 1 View [[id number]] Collected: 01/16/25 1236     Updated: 01/16/25 1240    Narrative:      XR CHEST 1 VW    Date of Exam: 1/16/2025 12:30 PM EST    Indication: Post ICD / Pacer Implant    Comparison: CT chest 7/30/2018. AP chest 7/30/2018.    Findings:  No acute airspace disease. Heart size is mildly enlarged but stable with signs of median sternotomy and CABG. Pacemaker generator remains over the right upper thorax. 2 leads project over the region of the right atrium the right atrial appendage, while 1   another -lead projects to the expected location of the right ventricle.. There is no visible pneumothorax. No evidence of pulmonary edema. No pleural effusion or acute osseous abnormality.      Impression:      Impression:    1. There are 2 lead tips projecting to the expected location of the right atrium or right atrial appendage, and the third lead appears similarly positioned over the level of the right ventricle.  2. Stable cardiac enlargement.  3. No visible pneumothorax.      Electronically Signed: Naheed Dale MD    1/16/2025 12:38  "PM EST    Workstation ID: [id number]              Condition on Discharge:  stable    Vital Signs  /65   Pulse 60   Temp 98.7 °F (37.1 °C) (Oral)   Resp 19   Ht 172.7 cm (68\")   Wt 93.6 kg (206 lb 5.6 oz)   SpO2 93%   BMI 31.38 kg/m²     Physical Exam  Vitals reviewed.   Constitutional:       General: He is awake.      Appearance: Normal appearance.   HENT:      Head: Normocephalic.   Eyes:      Pupils: Pupils are equal, round, and reactive to light.   Cardiovascular:      Rate and Rhythm: Normal rate and regular rhythm.      Comments: Right chest incision site clean and dry with steri-strips intact. No bleeding or hematoma noted at site.    Pulmonary:      Effort: Pulmonary effort is normal.      Breath sounds: Normal breath sounds.   Abdominal:      General: Bowel sounds are normal.   Musculoskeletal:         General: Normal range of motion.   Skin:     General: Skin is warm and dry.   Neurological:      Mental Status: He is alert and oriented to person, place, and time. Mental status is at baseline.   Psychiatric:         Behavior: Behavior is cooperative.         Discharge Disposition  Home or Self Care    Discharge Medications     Discharge Medications        New Medications        Instructions Start Date   cephalexin 500 MG capsule  Commonly known as: Keflex   500 mg, Oral, 2 Times Daily             Continue These Medications        Instructions Start Date   acetaminophen 500 MG tablet  Commonly known as: TYLENOL   500 mg, Oral, Every 6 Hours PRN      amLODIPine 5 MG tablet  Commonly known as: NORVASC   5 mg, Daily      atenolol 25 MG tablet  Commonly known as: TENORMIN   25 mg, Daily      atorvastatin 40 MG tablet  Commonly known as: LIPITOR   40 mg, Daily      clopidogrel 75 MG tablet  Commonly known as: PLAVIX   75 mg, Daily      lisinopril 40 MG tablet  Commonly known as: PRINIVIL,ZESTRIL   40 mg, Daily               Discharge Diet:  healthy heart    Activity at Discharge:  physical limitations " and restrictions discussed with patient      Follow-up Appointments  Future Appointments   Date Time Provider Department Center   1/29/2025 11:00 AM [facility] [place] DEVICE CHECK [facility]   2/3/2025  1:00 PM Radha Calvo APRN [facility] NA P [facility]   2/3/2025  1:00 PM [facility] [place] DEVICE CHECK [facility]   2/4/2025  2:00 PM Carlitos Carrera MD [facility]   5/5/2025  2:30 PM Oscar Curtis MD [facility] CAR NA P [facility]       Risk for Readmission (LACE) Score: 4 (1/17/2025  6:00 AM)      BEN Khan  01/17/25  10:24 EST  Electronically signed by BEN Khan, 01/17/25, 10:27 AM EST.

## 2025-01-17 NOTE — CASE MANAGEMENT/SOCIAL WORK
Case Management Discharge Note      Final Note: DC orders in place. No additional service needs identified. Will transport home with son. No barriers.        Transportation Services  Private: Car (with son)    Final Discharge Disposition Code: 01 - home or self-care    Cee Serna RN    Office Phone: [phone]  Office Cell:     [phone]

## 2025-01-20 RX ORDER — ATENOLOL 25 MG/1
25 TABLET ORAL DAILY
Qty: 90 TABLET | Refills: 3 | Status: SHIPPED | OUTPATIENT
Start: 2025-01-20

## 2025-01-20 RX ORDER — CLOPIDOGREL BISULFATE 75 MG/1
75 TABLET ORAL DAILY
Qty: 90 TABLET | Refills: 3 | Status: SHIPPED | OUTPATIENT
Start: 2025-01-20

## 2025-01-20 RX ORDER — AMLODIPINE BESYLATE 5 MG/1
5 TABLET ORAL DAILY
Qty: 90 TABLET | Refills: 3 | Status: SHIPPED | OUTPATIENT
Start: 2025-01-20

## 2025-01-29 ENCOUNTER — CLINICAL SUPPORT NO REQUIREMENTS (OUTPATIENT)
Dept: CARDIOLOGY | Facility: CLINIC | Age: 75
End: 2025-01-29
Payer: MEDICARE

## 2025-01-29 DIAGNOSIS — Z95.0 PRESENCE OF CARDIAC PACEMAKER: Primary | ICD-10-CM

## 2025-01-29 DIAGNOSIS — I49.5 SICK SINUS SYNDROME: ICD-10-CM

## 2025-01-29 NOTE — PROGRESS NOTES
Patient is in the office today s/p pacemaker replacement. Patient had right atrial lead capped and replaced. Interrogation is normal, leads stable. Removed steri strips with no issues. Incision is healed, well approximated with no redness or drainage noted. Patient does have some swelling, approximately twice depth of device. Advised to use cold compresses for swelling, may use warm compresses to increase circulation to area as well. Discussed right arm precaution, patient released to drive, may bowl right handed. Advised to wait 6 to 8 weeks to golf. Patient has an appt with Cardio NA on 2/3/25 for follow up.

## 2025-02-03 ENCOUNTER — OFFICE VISIT (OUTPATIENT)
Dept: CARDIOLOGY | Facility: CLINIC | Age: 75
End: 2025-02-03
Payer: MEDICARE

## 2025-02-03 ENCOUNTER — CLINICAL SUPPORT NO REQUIREMENTS (OUTPATIENT)
Dept: CARDIOLOGY | Facility: CLINIC | Age: 75
End: 2025-02-03
Payer: MEDICARE

## 2025-02-03 VITALS
BODY MASS INDEX: 31.83 KG/M2 | OXYGEN SATURATION: 96 % | HEIGHT: 68 IN | SYSTOLIC BLOOD PRESSURE: 130 MMHG | HEART RATE: 67 BPM | WEIGHT: 210 LBS | RESPIRATION RATE: 16 BRPM | DIASTOLIC BLOOD PRESSURE: 69 MMHG

## 2025-02-03 DIAGNOSIS — I45.2 RIGHT BUNDLE BRANCH BLOCK (RBBB) WITH LEFT POSTERIOR FASCICULAR BLOCK (LPFB): ICD-10-CM

## 2025-02-03 DIAGNOSIS — I49.5 SICK SINUS SYNDROME: Primary | ICD-10-CM

## 2025-02-03 DIAGNOSIS — I25.10 CORONARY ARTERY DISEASE INVOLVING NATIVE CORONARY ARTERY OF NATIVE HEART WITHOUT ANGINA PECTORIS: ICD-10-CM

## 2025-02-03 DIAGNOSIS — Z95.0 PRESENCE OF CARDIAC PACEMAKER: ICD-10-CM

## 2025-02-03 DIAGNOSIS — I10 ESSENTIAL HYPERTENSION: ICD-10-CM

## 2025-02-03 PROCEDURE — 99024 POSTOP FOLLOW-UP VISIT: CPT | Performed by: NURSE PRACTITIONER

## 2025-02-03 PROCEDURE — 3075F SYST BP GE 130 - 139MM HG: CPT | Performed by: NURSE PRACTITIONER

## 2025-02-03 PROCEDURE — 93280 PM DEVICE PROGR EVAL DUAL: CPT | Performed by: NURSE PRACTITIONER

## 2025-02-03 PROCEDURE — 1160F RVW MEDS BY RX/DR IN RCRD: CPT | Performed by: NURSE PRACTITIONER

## 2025-02-03 PROCEDURE — 3078F DIAST BP <80 MM HG: CPT | Performed by: NURSE PRACTITIONER

## 2025-02-03 PROCEDURE — 1159F MED LIST DOCD IN RCRD: CPT | Performed by: NURSE PRACTITIONER

## 2025-02-03 NOTE — PROGRESS NOTES
Cardiology Office Follow Up Visit      Primary Care Provider:  Carlitos Carrera MD    Reason for f/u:     Coronary artery disease  Sick sinus syndrome  Dual-chamber pacemaker  Recent generator replacement with new atrial lead placement      Subjective     CC:    Denies chest pain or dyspnea    History of Present Illness       Aashish Freeman is a 74 y.o. male. Patient is a very pleasant 74-year-old male well-known to our office who is here today to follow-up regarding his known CAD, sick sinus syndrome, dual-chamber pacemaker, hypertension, dyslipidemia.    Patient is known to have coronary artery disease requiring previous CABG in 1997.  He had post CABG PCI in July 2018 to the native LAD.  He was also noted at that time to have an occluded OM branch of the left circumflex and occluded RCA.  Vein graft to the RCA was patent, LAKE was no longer anastomosed to the LAD.     Patient has a history of intermittent AV block and sick sinus syndrome.  In 2012 he had a dual-chamber [employer] pacemaker placed.  The patient has previously been noted to have atrial lead malfunction and the device has been reprogrammed to single-chamber pacing mode with backup pacing at VVI with a rate of 55 by Dr. Carrera previously.     In November 2024 patient was referred to electrophysiology due to the device nearing JUSTA.  And for consideration of atrial lead implant.  On January 16, 2025 patient underwent implantation of a new right atrial lead and generator change.  Coronary sinus lead was attempted to be placed but unsuccessful due to inability to advance the CS guide into the CS despite multiple attempts by EP.    Patient returns today for follow-up.  He denies any current or recent chest pain, dyspnea, PND, orthopnea, palpitations, near syncope, lower extremity edema or feelings of his heart racing.  He reports compliance with prescribed medical therapy        ASSESSMENT/PLAN:        Diagnoses and all orders for this  visit:    1. Sick sinus syndrome (Primary)    2. 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 Presence of cardiac pacemaker    4. Coronary artery disease involving native coronary artery of native heart without angina pectoris    5. Essential hypertension           MEDICAL DECISION MAKING:    Patient appears to be well compensated.  He is not having any current symptoms suggestive of angina.    He underwent generator replacement back in January.  Pacemaker pocket in the right upper chest is without significant redness swelling mild hematoma.    Interrogation of his device today shows stable device function and thresholds.  He paces in the atrium 27% of the time.  There is been no arrhythmias.    Patient will return for an office check in 1 month.  At that time if his thresholds are stable will be released to resume activity.  He will keep his scheduled follow-up with Dr. Curtis      Past Medical History:   Diagnosis Date    AK (actinic keratosis)     multiple    Arthritis     Bilateral carotid artery stenosis 04/30/2012    CAD (coronary artery disease)     Carotid artery stenosis     Coronary artery disease involving native coronary artery of native heart without angina pectoris 04/30/2012    PCI to LAD 7/2018 CABG 1997     Hyperlipemia     Hypertension     Mixed hyperlipidemia 08/27/2019    Non-ST elevation myocardial infarction (NSTEMI)     2015    Peripheral vascular disease     Pre-diabetes     Presence of cardiac pacemaker 08/28/2019    BS PM 3/2012    Pulmonary nodule 2016    left lung    Sick sinus syndrome 08/27/2019    SSS (sick sinus syndrome)     intermittent AV block       Past Surgical History:   Procedure Laterality Date    CARDIAC ELECTROPHYSIOLOGY PROCEDURE N/A 1/16/2025    Procedure: Biventricular PACEMAKER Upgrade, [place] - REPS EMAILED;  Surgeon: Cassie Agudelo MD;  Location: [facility] CATH INVASIVE LOCATION;  Service: Cardiovascular;  Laterality: N/A;     CAROTID ENDARTERECTOMY Right 10/2014    CORONARY ANGIOPLASTY  2012    svg to obtuse marginal branch of lcx    CORONARY ANGIOPLASTY WITH STENT PLACEMENT  07/29/2018    pci to mid lad    CORONARY ARTERY BYPASS GRAFT  1997    5 vessels at [facility]/ Dr Armendariz    CORONARY STENT PLACEMENT  03/16/2015    stenting of svg graft to obtuse marginal and peripheral descending artery    FEMORAL POPLITEAL BYPASS  11/2014    ILIAC ARTERY STENT  12/2014    INSERT / REPLACE / REMOVE PACEMAKER      PACEMAKER IMPLANTATION  03/29/2012    [employer]         Current Outpatient Medications:     acetaminophen (TYLENOL) 500 MG tablet, Take 1 tablet by mouth Every 6 (Six) Hours As Needed for Mild Pain ., Disp: 30 tablet, Rfl: 0    amLODIPine (NORVASC) 5 MG tablet, TAKE 1 TABLET BY MOUTH DAILY, Disp: 90 tablet, Rfl: 3    atenolol (TENORMIN) 25 MG tablet, TAKE 1 TABLET BY MOUTH DAILY, Disp: 90 tablet, Rfl: 3    atorvastatin (LIPITOR) 40 MG tablet, Take 1 tablet by mouth Daily., Disp: , Rfl:     clopidogrel (PLAVIX) 75 MG tablet, TAKE 1 TABLET BY MOUTH DAILY, Disp: 90 tablet, Rfl: 3    lisinopril (PRINIVIL,ZESTRIL) 40 MG tablet, Take 1 tablet by mouth Daily., Disp: , Rfl:     Social History     Socioeconomic History    Marital status:    Tobacco Use    Smoking status: Every Day     Current packs/day: 1.00     Average packs/day: 1 pack/day for 40.0 years (40.0 ttl pk-yrs)     Types: Cigarettes     Passive exposure: Past    Smokeless tobacco: Never   Vaping Use    Vaping status: Never Used   Substance and Sexual Activity    Alcohol use: No    Drug use: No    Sexual activity: Defer       Family History   Problem Relation Age of Onset    Heart disease Father        The following portions of the patient's history were reviewed and updated as appropriate: allergies, current medications, past family history, past medical history, past social history, past surgical history and problem list.    Review of Systems   All other systems  "reviewed and are negative.    Pertinent items are noted in HPI, all other systems reviewed and negative      /69 (BP Location: Right arm, Patient Position: Sitting, Cuff Size: Large Adult)   Pulse 67   Resp 16   Ht 172.7 cm (68\")   Wt 95.3 kg (210 lb)   SpO2 96%   BMI 31.93 kg/m² .    Objective     Vitals reviewed.   Constitutional:       General: Not in acute distress.     Appearance: Normal appearance. Well-developed.   Eyes:      Pupils: Pupils are equal, round, and reactive to light.   HENT:      Head: Normocephalic and atraumatic.   Neck:      Vascular: No JVD.   Pulmonary:      Effort: Pulmonary effort is normal.      Breath sounds: Normal breath sounds.   Cardiovascular:      Normal rate. Regular rhythm.      Comments: Pacemaker pocket left upper chest without significant redness or swelling.  There is a small hematoma  Edema:     Peripheral edema absent.   Abdominal:      General: There is no distension.      Palpations: Abdomen is soft.      Tenderness: There is no abdominal tenderness.   Musculoskeletal: Normal range of motion.      Cervical back: Normal range of motion and neck supple. Skin:     General: Skin is warm and dry.   Neurological:      Mental Status: Alert and oriented to person, place, and time.           EKG ordered and reviewed by me in office  Procedures        In Office Device Interrogation: Reviewed    DEVICE INTERROGATION:  IN OFFICE    DEVICE TYPE:   Dual-chamber pacemaker    :   [employer]row[employer]cer    BATTERY:  Stable    TIME TO ELECTIVE REPLACEMENT INDICATORS:   14.5 years    CHARGE TIME:   Not applicable        LEAD DATA:   LEADS Reprogrammed for testing purposes    Atrial:   5.5 mV, 611 ohms, 0.7 V@0.4 ms    Ventricular:     23.6 mV, 566 ohms, 0.5 V@0.4 ms    LV:      Pacemaker Dependent: No      Atrial pacing percentage: 27%    Ventricular pacing percentage: 3%      Arrhythmia Logbook Reviewed: No A-fib        Summary:    Stable Device Function    No " significant arhythmia burden.     Battery status is stable.      NEXT IN OFFICE DEVICE CHECK DUE: 1 month    REMOTE DEVICE INTERROGATIONS: Going

## 2025-02-04 ENCOUNTER — OFFICE VISIT (OUTPATIENT)
Dept: FAMILY MEDICINE CLINIC | Facility: CLINIC | Age: 75
End: 2025-02-04
Payer: MEDICARE

## 2025-02-04 ENCOUNTER — LAB (OUTPATIENT)
Dept: FAMILY MEDICINE CLINIC | Facility: CLINIC | Age: 75
End: 2025-02-04
Payer: MEDICARE

## 2025-02-04 VITALS
RESPIRATION RATE: 16 BRPM | OXYGEN SATURATION: 97 % | HEART RATE: 67 BPM | BODY MASS INDEX: 31.74 KG/M2 | HEIGHT: 68 IN | DIASTOLIC BLOOD PRESSURE: 72 MMHG | SYSTOLIC BLOOD PRESSURE: 138 MMHG | WEIGHT: 209.4 LBS

## 2025-02-04 DIAGNOSIS — Z87.891 PERSONAL HISTORY OF NICOTINE DEPENDENCE: ICD-10-CM

## 2025-02-04 DIAGNOSIS — I25.10 CORONARY ARTERY DISEASE INVOLVING NATIVE CORONARY ARTERY OF NATIVE HEART WITHOUT ANGINA PECTORIS: ICD-10-CM

## 2025-02-04 DIAGNOSIS — I10 ESSENTIAL HYPERTENSION: ICD-10-CM

## 2025-02-04 DIAGNOSIS — M25.551 RIGHT HIP PAIN: ICD-10-CM

## 2025-02-04 DIAGNOSIS — Z12.5 ENCOUNTER FOR SCREENING FOR MALIGNANT NEOPLASM OF PROSTATE: ICD-10-CM

## 2025-02-04 DIAGNOSIS — I73.9 PAD (PERIPHERAL ARTERY DISEASE): ICD-10-CM

## 2025-02-04 DIAGNOSIS — Z00.00 MEDICARE ANNUAL WELLNESS VISIT, SUBSEQUENT: Primary | ICD-10-CM

## 2025-02-04 DIAGNOSIS — I49.5 SICK SINUS SYNDROME: ICD-10-CM

## 2025-02-04 DIAGNOSIS — E55.9 VITAMIN D DEFICIENCY, UNSPECIFIED: ICD-10-CM

## 2025-02-04 PROCEDURE — 36415 COLL VENOUS BLD VENIPUNCTURE: CPT | Performed by: FAMILY MEDICINE

## 2025-02-04 PROCEDURE — 1126F AMNT PAIN NOTED NONE PRSNT: CPT | Performed by: FAMILY MEDICINE

## 2025-02-04 PROCEDURE — 1170F FXNL STATUS ASSESSED: CPT | Performed by: FAMILY MEDICINE

## 2025-02-04 PROCEDURE — 83036 HEMOGLOBIN GLYCOSYLATED A1C: CPT | Performed by: FAMILY MEDICINE

## 2025-02-04 PROCEDURE — 3075F SYST BP GE 130 - 139MM HG: CPT | Performed by: FAMILY MEDICINE

## 2025-02-04 PROCEDURE — 85025 COMPLETE CBC W/AUTO DIFF WBC: CPT | Performed by: FAMILY MEDICINE

## 2025-02-04 PROCEDURE — 84443 ASSAY THYROID STIM HORMONE: CPT | Performed by: FAMILY MEDICINE

## 2025-02-04 PROCEDURE — 1160F RVW MEDS BY RX/DR IN RCRD: CPT | Performed by: FAMILY MEDICINE

## 2025-02-04 PROCEDURE — 80053 COMPREHEN METABOLIC PANEL: CPT | Performed by: FAMILY MEDICINE

## 2025-02-04 PROCEDURE — G0103 PSA SCREENING: HCPCS | Performed by: FAMILY MEDICINE

## 2025-02-04 PROCEDURE — 3078F DIAST BP <80 MM HG: CPT | Performed by: FAMILY MEDICINE

## 2025-02-04 PROCEDURE — 84439 ASSAY OF FREE THYROXINE: CPT | Performed by: FAMILY MEDICINE

## 2025-02-04 PROCEDURE — 99214 OFFICE O/P EST MOD 30 MIN: CPT | Performed by: FAMILY MEDICINE

## 2025-02-04 PROCEDURE — 80061 LIPID PANEL: CPT | Performed by: FAMILY MEDICINE

## 2025-02-04 PROCEDURE — 1159F MED LIST DOCD IN RCRD: CPT | Performed by: FAMILY MEDICINE

## 2025-02-04 PROCEDURE — 82306 VITAMIN D 25 HYDROXY: CPT | Performed by: FAMILY MEDICINE

## 2025-02-04 PROCEDURE — G0439 PPPS, SUBSEQ VISIT: HCPCS | Performed by: FAMILY MEDICINE

## 2025-02-04 PROCEDURE — 82607 VITAMIN B-12: CPT | Performed by: FAMILY MEDICINE

## 2025-02-04 NOTE — ASSESSMENT & PLAN NOTE
s/p 5V CABG in 1997, PCI to obtuse marginal branch of Lcx, PCI of SVG to obtuse marginal and PAD in 3/2015, and PCI to mid LAD 7/2018.  - Cont home atenolol 25mg daily, atorvastatin 40mg, and Plavix 75mg daily  - Cont CARDS f/u- Ever

## 2025-02-04 NOTE — ASSESSMENT & PLAN NOTE
s/p R carotid endarterectomy in 10/2014, bilateral fem-pop bypass in 11/2014, L iliac artery stent 8/2014 and R iliac stent 10/2014

## 2025-02-04 NOTE — ASSESSMENT & PLAN NOTE
138/72 today  - Cont home amlodipine 5mg daily, atenolol 25mg daily, and lisinopril 40mg daily  Orders:    Comprehensive Metabolic Panel

## 2025-02-04 NOTE — PROGRESS NOTES
Subjective   The ABCs of the Annual Wellness Visit  Medicare Wellness Visit    Aashish Freeman is a 74 y.o. patient who presents for a Medicare Wellness Visit.    The following portions of the patient's history were reviewed and   updated as appropriate: allergies, current medications, past family history, past medical history, past social history, past surgical history, and problem list.    Compared to one year ago, the patient's physical   health is the same.  Compared to one year ago, the patient's mental   health is the same.    Recent Hospitalizations:  He was admitted within the past 365 days at [facility].   1/2025- pacemaker battery change    Current Medical Providers:  Patient Care Team:  Carlitos Carrera MD as PCP - General (Internal Medicine)  Cassie Agudelo MD as Consulting Physician (Cardiology)    Outpatient Medications Prior to Visit   Medication Sig Dispense Refill    acetaminophen (TYLENOL) 500 MG tablet Take 1 tablet by mouth Every 6 (Six) Hours As Needed for Mild Pain . 30 tablet 0    amLODIPine (NORVASC) 5 MG tablet TAKE 1 TABLET BY MOUTH DAILY 90 tablet 3    atenolol (TENORMIN) 25 MG tablet TAKE 1 TABLET BY MOUTH DAILY 90 tablet 3    atorvastatin (LIPITOR) 40 MG tablet Take 1 tablet by mouth Daily.      clopidogrel (PLAVIX) 75 MG tablet TAKE 1 TABLET BY MOUTH DAILY 90 tablet 3    lisinopril (PRINIVIL,ZESTRIL) 40 MG tablet Take 1 tablet by mouth Daily.       No facility-administered medications prior to visit.     No opioid medication identified on active medication list. I have reviewed chart for other potential  high risk medication/s and harmful drug interactions in the elderly.      Aspirin is not on active medication list.  Aspirin use is not indicated based on review of current medical condition/s. Risk of harm outweighs potential benefits.  .    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  ACP discussion was held with the patient during this visit. Patient has an advance directive in EMR which is still valid.       Objective   Vitals:    02/04/25 1407   BP: 138/72   Pulse: 67   Resp: 16   SpO2: 97%   Weight: 95 kg (209 lb 6.4 oz)   Height: 172.7 cm (68\")   PainSc: 0-No pain       Estimated body mass index is 31.84 kg/m² as calculated from the following:    Height as of this encounter: 172.7 cm (68\").    Weight as of this encounter: 95 kg (209 lb 6.4 oz).    Does the patient have evidence of cognitive impairment? No  Lab Results   Component Value Date    TRIG 256 (H) 02/04/2025    HDL 37 (L) 02/04/2025    LDL 66 02/04/2025    VLDL 41 (H) 02/04/2025    HGBA1C 5.80 (H) 02/04/2025                                                                                             Health  Risk Assessment    Smoking Status:  Social History     Tobacco Use   Smoking Status Every Day    Current packs/day: 1.00    Average packs/day: 1 pack/day for 40.0 years (40.0 ttl pk-yrs)    Types: Cigarettes    Passive exposure: Past   Smokeless Tobacco Never   1ppd x60yrs (16-16yo)    Alcohol Consumption:  Social History     Substance and Sexual Activity   Alcohol Use No     Fall Risk Screen  STEADI Fall Risk Assessment was completed, and patient is at LOW risk for falls.Assessment completed on:2/4/2025    Depression Screening   Little interest or pleasure in doing things? Not at all   Feeling down, depressed, or hopeless? Not at all   PHQ-2 Total Score 0      Health Habits and Functional and " Cognitive Screenin/4/2025     2:06 PM   Functional & Cognitive Status   Do you have difficulty preparing food and eating? No   Do you have difficulty bathing yourself, getting dressed or grooming yourself? No   Do you have difficulty using the toilet? No   Do you have difficulty moving around from place to place? No   Do you have trouble with steps or getting out of a bed or a chair? No   Current Diet Well Balanced Diet   Dental Exam Not up to date   Eye Exam Up to date   Exercise (times per week) 5 times per week   Current Exercises Include Other;Yard Work   Do you need help using the phone?  No   Are you deaf or do you have serious difficulty hearing?  No   Do you need help to go to places out of walking distance? No   Do you need help shopping? No   Do you need help preparing meals?  No   Do you need help with housework?  No   Do you need help with laundry? No   Do you need help taking your medications? No   Do you need help managing money? No   Do you ever drive or ride in a car without wearing a seat belt? No   Have you felt unusual stress, anger or loneliness in the last month? No   Who do you live with? Alone   If you need help, do you have trouble finding someone available to you? No   Have you been bothered in the last four weeks by sexual problems? No   Do you have difficulty concentrating, remembering or making decisions? No           Age-appropriate Screening Schedule:  Refer to the list below for future screening recommendations based on patient's age, sex and/or medical conditions. Orders for these recommended tests are listed in the plan section. The patient has been provided with a written plan.    Health Maintenance List  Health Maintenance   Topic Date Due    ZOSTER VACCINE (1 of 2) Never done    Pneumococcal Vaccine 65+ (2 of 2 - PPSV23) 2017    LUNG CANCER SCREENING  2019    HEPATITIS C SCREENING  Never done    RSV Vaccine - Adults (1 - 1-dose 75+ series) Never done     INFLUENZA VACCINE  03/31/2025 (Originally 7/1/2024)    COVID-19 Vaccine (1 - 2024-25 season) 12/31/2025 (Originally 9/1/2024)    BMI FOLLOWUP  08/13/2025    ANNUAL WELLNESS VISIT  02/04/2026    LIPID PANEL  02/04/2026    TDAP/TD VACCINES (2 - Td or Tdap) 01/05/2030                                                                                                                                              CMS Preventative Services Quick Reference  Risk Factors Identified During Encounter  Immunizations Discussed/Encouraged: Influenza, Prevnar 20 (Pneumococcal 20-valent conjugate), Shingrix, and COVID19    The above risks/problems have been discussed with the patient.  Pertinent information has been shared with the patient in the After Visit Summary.  An After Visit Summary and PPPS were made available to the patient.    Preventative  Colonoscopy: Never, declined  PSA: Ordered today  CT chest: Ordered today  AAA: Ordered today  DEXA: Deferred  Shingles: Recommended  Pneumonia: PCV13 2/2017. Recommended, declined  Tdap: 1/2020  Influenza: Recommended, declined  COVID: Recommended, declined    Follow Up:   Next Medicare Wellness visit to be scheduled in 1 year.     Additional E&M Note during same encounter follows:  Patient has additional, significant, and separately identifiable condition(s)/problem(s) that require work above and beyond the Medicare Wellness Visit     Chief Complaint  Medicare Wellness-subsequent    Subjective   HPI  Aashish is also being seen today for additional medical problem/s.        CAD: s/p 5V CABG in 1997, PCI to obtuse marginal branch of Lcx, PCI of SVG to obtuse marginal and PAD in 3/2015, and PCI to mid LAD 7/2018. Follows w/ MANJIT Curtis. Denies CP, SOB, palpitations. Maintained on atenolol 25mg daily, atorvastatin 40mg, and Plavix 75mg daily    SSS: s/p pacemaker implant 3/2012 w/ battery change 1/2025. Follows sid/ MANJIT Curtis. No complaints today    HTN: 138/72 today. Maintained on  "amlodipine 5mg daily, atenolol 25mg daily, and lisinopril 40mg daily. No LH/dizziness, CP or SOB    PAD: s/p R carotid endarterectomy in 10/2014, bilateral fem-pop bypass in 11/2014, L iliac artery stent 8/2014 and R iliac stent 10/2014. Follows w/ vascular- [facility]. No pain in feet w/ walking. Maintained on atorvastatin 40mg daily and Plavix 75mg daily.     R hip pain: patient reports chronic R hip pain that limits his ability to raise the R leg and walk the golf course. Not interested in further evaluation/mgmt at this time    Objective   Vital Signs:  /72   Pulse 67   Resp 16   Ht 172.7 cm (68\")   Wt 95 kg (209 lb 6.4 oz)   SpO2 97%   BMI 31.84 kg/m²   Physical Exam  Constitutional:       General: He is not in acute distress.     Appearance: He is well-developed. He is obese.   HENT:      Head: Normocephalic and atraumatic.      Right Ear: Tympanic membrane and external ear normal. There is no impacted cerumen.      Left Ear: Tympanic membrane and external ear normal. There is no impacted cerumen.      Nose: Nose normal.      Mouth/Throat:      Mouth: Mucous membranes are moist.      Pharynx: No oropharyngeal exudate or posterior oropharyngeal erythema.   Eyes:      General: No scleral icterus.        Right eye: No discharge.         Left eye: No discharge.      Extraocular Movements: Extraocular movements intact.      Conjunctiva/sclera: Conjunctivae normal.      Pupils: Pupils are equal, round, and reactive to light.   Neck:      Thyroid: No thyromegaly.      Vascular: No carotid bruit.   Cardiovascular:      Rate and Rhythm: Normal rate and regular rhythm.      Heart sounds: Normal heart sounds. No murmur heard.  Pulmonary:      Effort: Pulmonary effort is normal. No respiratory distress.      Breath sounds: Normal breath sounds. No wheezing or rales.   Abdominal:      General: Bowel sounds are normal. There is no distension.      Palpations: Abdomen is soft.      Tenderness: There is no " abdominal tenderness.   Musculoskeletal:         General: No deformity. Normal range of motion.      Cervical back: Normal range of motion and neck supple.   Lymphadenopathy:      Cervical: No cervical adenopathy.   Skin:     General: Skin is warm and dry.      Findings: No rash.   Neurological:      General: No focal deficit present.      Mental Status: He is alert and oriented to person, place, and time. Mental status is at baseline.      Cranial Nerves: No cranial nerve deficit.      Sensory: No sensory deficit.   Psychiatric:         Behavior: Behavior normal.         Thought Content: Thought content normal.           Assessment and Plan Additional age appropriate preventative wellness advice topics were discussed during today's preventative wellness exam(some topics already addressed during AWV portion of the note above):    Physical Activity: Advised cardiovascular activity 150 minutes per week as tolerated. (example brisk walk for 30 minutes, 5 days a week).     Tobacco Misuse Discussion     Medicare annual wellness visit, subsequent  - Counseled regarding diet, exercise, weight loss, smoking cessation and preventative health maintenance items/immunizations below  Orders:    CBC & Differential    Comprehensive Metabolic Panel    Hemoglobin A1c    Lipid Panel    TSH    T4, Free    Vitamin D,25-Hydroxy    Vitamin B12    PSA Screen     CT Chest Low Dose Cancer Screening WO; Future    [place] aaa screen limited; Future    Coronary artery disease involving native coronary artery of native heart without angina pectoris  s/p 5V CABG in 1997, PCI to obtuse marginal branch of Lcx, PCI of SVG to obtuse marginal and PAD in 3/2015, and PCI to mid LAD 7/2018.  - Cont home atenolol 25mg daily, atorvastatin 40mg, and Plavix 75mg daily  - Cont CARDS f/u- Ever  Sick sinus syndrome  s/p pacemaker implant 3/2012 w/ battery change 1/2025  - Cont CARDS f/u- Ever  Essential hypertension  138/72 today  - Cont home amlodipine 5mg  daily, atenolol 25mg daily, and lisinopril 40mg daily  Orders:    Comprehensive Metabolic Panel    PAD (peripheral artery disease)  s/p R carotid endarterectomy in 10/2014, bilateral fem-pop bypass in 11/2014, L iliac artery stent 8/2014 and R iliac stent 10/2014  Right hip pain  Not interested in further evaluation/mgmt at this time  - Monitor  Vitamin D deficiency, unspecified  Orders:    Vitamin D,25-Hydroxy    Encounter for screening for malignant neoplasm of prostate  Orders:    PSA Screen    Personal history of nicotine dependence  Orders:     CT Chest Low Dose Cancer Screening WO; Future            Follow Up   Return in about 1 year (around 2/4/2026) for Medicare Wellness.  Patient was given instructions and counseling regarding his condition or for health maintenance advice. Please see specific information pulled into the AVS if appropriate.

## 2025-02-05 DIAGNOSIS — Z72.0 TOBACCO USE: ICD-10-CM

## 2025-02-05 DIAGNOSIS — R97.20 ELEVATED PSA: Primary | ICD-10-CM

## 2025-02-05 DIAGNOSIS — D75.1 POLYCYTHEMIA: ICD-10-CM

## 2025-02-05 LAB
25(OH)D3 SERPL-MCNC: 22.3 NG/ML (ref 30–100)
ALBUMIN SERPL-MCNC: 4.2 G/DL (ref 3.5–5.2)
ALBUMIN/GLOB SERPL: 1.2 G/DL
ALP SERPL-CCNC: 102 U/L (ref 39–117)
ALT SERPL W P-5'-P-CCNC: 28 U/L (ref 1–41)
ANION GAP SERPL CALCULATED.3IONS-SCNC: 12.6 MMOL/L (ref 5–15)
AST SERPL-CCNC: 24 U/L (ref 1–40)
BASOPHILS # BLD AUTO: 0.04 10*3/MM3 (ref 0–0.2)
BASOPHILS NFR BLD AUTO: 0.6 % (ref 0–1.5)
BILIRUB SERPL-MCNC: 0.6 MG/DL (ref 0–1.2)
BUN SERPL-MCNC: 9 MG/DL (ref 8–23)
BUN/CREAT SERPL: 8.5 (ref 7–25)
CALCIUM SPEC-SCNC: 9.7 MG/DL (ref 8.6–10.5)
CHLORIDE SERPL-SCNC: 104 MMOL/L (ref 98–107)
CHOLEST SERPL-MCNC: 144 MG/DL (ref 0–200)
CO2 SERPL-SCNC: 25.4 MMOL/L (ref 22–29)
CREAT SERPL-MCNC: 1.06 MG/DL (ref 0.76–1.27)
DEPRECATED RDW RBC AUTO: 41.8 FL (ref 37–54)
EGFRCR SERPLBLD CKD-EPI 2021: 72.7 ML/MIN/1.73
EOSINOPHIL # BLD AUTO: 0.11 10*3/MM3 (ref 0–0.4)
EOSINOPHIL NFR BLD AUTO: 1.8 % (ref 0.3–6.2)
ERYTHROCYTE [DISTWIDTH] IN BLOOD BY AUTOMATED COUNT: 12.7 % (ref 12.3–15.4)
GLOBULIN UR ELPH-MCNC: 3.6 GM/DL
GLUCOSE SERPL-MCNC: 96 MG/DL (ref 65–99)
HBA1C MFR BLD: 5.8 % (ref 4.8–5.6)
HCT VFR BLD AUTO: 55.1 % (ref 37.5–51)
HDLC SERPL-MCNC: 37 MG/DL (ref 40–60)
HGB BLD-MCNC: 18.3 G/DL (ref 13–17.7)
IMM GRANULOCYTES # BLD AUTO: 0.03 10*3/MM3 (ref 0–0.05)
IMM GRANULOCYTES NFR BLD AUTO: 0.5 % (ref 0–0.5)
LDLC SERPL CALC-MCNC: 66 MG/DL (ref 0–100)
LDLC/HDLC SERPL: 1.51 {RATIO}
LYMPHOCYTES # BLD AUTO: 1.79 10*3/MM3 (ref 0.7–3.1)
LYMPHOCYTES NFR BLD AUTO: 28.8 % (ref 19.6–45.3)
MCH RBC QN AUTO: 29.9 PG (ref 26.6–33)
MCHC RBC AUTO-ENTMCNC: 33.2 G/DL (ref 31.5–35.7)
MCV RBC AUTO: 90 FL (ref 79–97)
MONOCYTES # BLD AUTO: 1.03 10*3/MM3 (ref 0.1–0.9)
MONOCYTES NFR BLD AUTO: 16.6 % (ref 5–12)
NEUTROPHILS NFR BLD AUTO: 3.21 10*3/MM3 (ref 1.7–7)
NEUTROPHILS NFR BLD AUTO: 51.7 % (ref 42.7–76)
NRBC BLD AUTO-RTO: 0 /100 WBC (ref 0–0.2)
PLATELET # BLD AUTO: 191 10*3/MM3 (ref 140–450)
PMV BLD AUTO: 11.5 FL (ref 6–12)
POTASSIUM SERPL-SCNC: 5 MMOL/L (ref 3.5–5.2)
PROT SERPL-MCNC: 7.8 G/DL (ref 6–8.5)
PSA SERPL-MCNC: 7.98 NG/ML (ref 0–4)
RBC # BLD AUTO: 6.12 10*6/MM3 (ref 4.14–5.8)
SODIUM SERPL-SCNC: 142 MMOL/L (ref 136–145)
T4 FREE SERPL-MCNC: 1.2 NG/DL (ref 0.92–1.68)
TRIGL SERPL-MCNC: 256 MG/DL (ref 0–150)
TSH SERPL DL<=0.05 MIU/L-ACNC: 2.67 UIU/ML (ref 0.27–4.2)
VIT B12 BLD-MCNC: 429 PG/ML (ref 211–946)
VLDLC SERPL-MCNC: 41 MG/DL (ref 5–40)
WBC NRBC COR # BLD AUTO: 6.21 10*3/MM3 (ref 3.4–10.8)

## 2025-02-05 RX ORDER — SODIUM CHLORIDE 9 MG/ML
250 INJECTION, SOLUTION INTRAVENOUS ONCE
OUTPATIENT
Start: 2025-02-05

## 2025-02-05 NOTE — PROGRESS NOTES
I spoke with Aashish Freeman regarding results, patient verbalizes understanding. Patient scheduled for 4 month follow up.

## 2025-02-11 RX ORDER — SODIUM CHLORIDE 9 MG/ML
250 INJECTION, SOLUTION INTRAVENOUS ONCE
Status: CANCELLED | OUTPATIENT
Start: 2025-02-11

## 2025-02-12 ENCOUNTER — HOSPITAL ENCOUNTER (OUTPATIENT)
Dept: INFUSION THERAPY | Facility: HOSPITAL | Age: 75
Discharge: HOME | End: 2025-02-12
Admitting: FAMILY MEDICINE
Payer: MEDICARE

## 2025-02-12 VITALS
SYSTOLIC BLOOD PRESSURE: 136 MMHG | DIASTOLIC BLOOD PRESSURE: 75 MMHG | TEMPERATURE: 97.8 F | HEART RATE: 64 BPM | OXYGEN SATURATION: 98 % | RESPIRATION RATE: 18 BRPM

## 2025-02-12 DIAGNOSIS — D75.1 POLYCYTHEMIA: Primary | ICD-10-CM

## 2025-02-12 DIAGNOSIS — Z72.0 TOBACCO USE: ICD-10-CM

## 2025-02-12 LAB
HCT VFR BLD AUTO: 53.4 % (ref 37.5–51)
HGB BLD-MCNC: 17.8 G/DL (ref 13–17.7)

## 2025-02-12 PROCEDURE — 85014 HEMATOCRIT: CPT | Performed by: FAMILY MEDICINE

## 2025-02-12 PROCEDURE — 99195 PHLEBOTOMY: CPT

## 2025-02-12 PROCEDURE — 85018 HEMOGLOBIN: CPT | Performed by: FAMILY MEDICINE

## 2025-02-12 PROCEDURE — 36415 COLL VENOUS BLD VENIPUNCTURE: CPT

## 2025-02-12 RX ORDER — SODIUM CHLORIDE 9 MG/ML
250 INJECTION, SOLUTION INTRAVENOUS ONCE
OUTPATIENT
Start: 2025-02-12

## 2025-02-12 NOTE — PROGRESS NOTES
PHLEBOTOMY    Start Time: 1543    End Time: 1555    Hemaglobin Value: 17.8    Hematocrit Value: 53.4    Ferritin Value: n/a      Permit Signed: Self Signed    Prep with Chlorhexidine: no, alcohol     Needle Size: 16 Gauge    Number of Attempts: 3      Scale Calibration: yes    Amount Drawn: 500ml = 529grams    Phlebotomy Completed: yes    Phlebotomy Site: Left Antecubital    Dressing: Pressure Dressing Applied    Anchored: Held    Blood Discarded Per Protocol: yes    200+/- 5 grams: yes  500+/- 5 grams: yes    Self Care Assistance: Unassisted and Steady

## 2025-02-14 DIAGNOSIS — Z00.00 MEDICARE ANNUAL WELLNESS VISIT, SUBSEQUENT: ICD-10-CM

## 2025-02-14 DIAGNOSIS — Z87.891 PERSONAL HISTORY OF NICOTINE DEPENDENCE: ICD-10-CM

## 2025-02-17 ENCOUNTER — TELEPHONE (OUTPATIENT)
Dept: FAMILY MEDICINE CLINIC | Facility: CLINIC | Age: 75
End: 2025-02-17
Payer: MEDICARE

## 2025-02-17 NOTE — TELEPHONE ENCOUNTER
----- Message from Carlitos Carrera sent at 2/15/2025  2:43 PM EST -----  Aortic ultrasound negative for anuerysm

## 2025-02-17 NOTE — TELEPHONE ENCOUNTER
----- Message from Carlitos Carrera sent at 2/15/2025  2:42 PM EST -----  CT chest negative for cancer. No further work-up needed at this time but repeat scan will be recommended next year

## 2025-02-26 RX ORDER — LISINOPRIL 40 MG/1
40 TABLET ORAL DAILY
Qty: 90 TABLET | Refills: 3 | Status: SHIPPED | OUTPATIENT
Start: 2025-02-26

## 2025-02-26 NOTE — TELEPHONE ENCOUNTER
Caller: Aashish Freeman    Relationship: Self    Best call back number: [phone]     Requested Prescriptions:   Requested Prescriptions     Pending Prescriptions Disp Refills    lisinopril (PRINIVIL,ZESTRIL) 40 MG tablet       Sig: Take 1 tablet by mouth Daily.        Pharmacy where request should be sent: [id number] - [place], [place] - [address] [place] [address] - [phone] [facility] [phone] FX     Last office visit with prescribing clinician: 10/29/2024   Last telemedicine visit with prescribing clinician: Visit date not found   Next office visit with prescribing clinician: 5/5/2025     Additional details provided by patient: NA    Does the patient have less than a 3 day supply:  [] Yes  [x] No    Would you like a call back once the refill request has been completed: [x] Yes [] No    If the office needs to give you a call back, can they leave a voicemail: [x] Yes [] No    Morena Wheatley Rep   02/26/25 08:45 EST

## 2025-03-04 ENCOUNTER — OFFICE VISIT (OUTPATIENT)
Dept: CARDIOLOGY | Facility: CLINIC | Age: 75
End: 2025-03-04
Payer: MEDICARE

## 2025-03-04 ENCOUNTER — TELEPHONE (OUTPATIENT)
Dept: CARDIOLOGY | Facility: CLINIC | Age: 75
End: 2025-03-04
Payer: MEDICARE

## 2025-03-04 ENCOUNTER — CLINICAL SUPPORT NO REQUIREMENTS (OUTPATIENT)
Dept: CARDIOLOGY | Facility: CLINIC | Age: 75
End: 2025-03-04
Payer: MEDICARE

## 2025-03-04 VITALS
SYSTOLIC BLOOD PRESSURE: 128 MMHG | DIASTOLIC BLOOD PRESSURE: 72 MMHG | OXYGEN SATURATION: 97 % | WEIGHT: 208 LBS | RESPIRATION RATE: 16 BRPM | BODY MASS INDEX: 31.52 KG/M2 | HEIGHT: 68 IN | HEART RATE: 64 BPM

## 2025-03-04 DIAGNOSIS — I10 ESSENTIAL HYPERTENSION: ICD-10-CM

## 2025-03-04 DIAGNOSIS — I49.5 SICK SINUS SYNDROME: Primary | ICD-10-CM

## 2025-03-04 DIAGNOSIS — I25.10 CORONARY ARTERY DISEASE INVOLVING NATIVE CORONARY ARTERY OF NATIVE HEART WITHOUT ANGINA PECTORIS: ICD-10-CM

## 2025-03-04 DIAGNOSIS — Z95.0 PRESENCE OF CARDIAC PACEMAKER: ICD-10-CM

## 2025-03-04 RX ORDER — LISINOPRIL 40 MG/1
40 TABLET ORAL DAILY
Qty: 90 TABLET | Refills: 1 | Status: SHIPPED | OUTPATIENT
Start: 2025-03-04

## 2025-03-04 RX ORDER — LISINOPRIL 40 MG/1
40 TABLET ORAL DAILY
Qty: 30 TABLET | Refills: 1 | Status: SHIPPED | OUTPATIENT
Start: 2025-03-04 | End: 2025-03-04 | Stop reason: SDUPTHER

## 2025-03-04 NOTE — PROGRESS NOTES
Cardiology Office Follow Up Visit      Primary Care Provider:  Carlitos Carrera MD    Reason for f/u:     Coronary artery disease  Sick sinus syndrome  Dual-chamber pacemaker  Recent new atrial lead implant      Subjective     CC:    Denies chest pain or dyspnea    History of Present Illness       Aashish Freeman is a 74 y.o. male.  Patient is a very pleasant 74-year-old male well-known to our office who is here today to follow-up regarding his known CAD, sick sinus syndrome, dual-chamber pacemaker, hypertension, dyslipidemia.     Patient is known to have coronary artery disease requiring previous CABG in 1997.  He had post CABG PCI in July 2018 to the native LAD.  He was also noted at that time to have an occluded OM branch of the left circumflex and occluded RCA.  Vein graft to the RCA was patent, LAKE was no longer anastomosed to the LAD.     Patient has a history of intermittent AV block and sick sinus syndrome.  In 2012 he had a dual-chamber [employer] pacemaker placed.  The patient has previously been noted to have atrial lead malfunction and the device has been reprogrammed to single-chamber pacing mode with backup pacing at VVI with a rate of 55 by Dr. Carrera previously.     In November 2024 patient was referred to electrophysiology due to the device nearing JUSTA.  And for consideration of atrial lead implant. On January 16, 2025 patient underwent implantation of a new right atrial lead and generator change.  Coronary sinus lead was attempted to be placed but unsuccessful due to inability to advance the CS guide into the CS despite multiple attempts by EP.     Denies any current or recent chest pain, dyspnea, PND, orthopnea, palpitations, near syncope, lower extremity edema or feelings of his heart racing.  He reports compliance with prescribed medical therapy.      ASSESSMENT/PLAN:        Diagnoses and all orders for this visit:    1. Sick sinus syndrome (Primary)    2. Essential  hypertension    3. Coronary artery disease involving native coronary artery of native heart without angina pectoris           MEDICAL DECISION MAKING:    Patient appears to be well compensated.  Pacemaker pocket in right upper chest is without significant redness swelling or hematoma.  Wound edges have healed nicely.  In office device interrogation shows stable pacing sensing and thresholds of all leads.  Patient's had no A-fib.    We will continue monitoring his pacemaker via [employer].  He has upcoming follow-up with Dr. Curtis.  Blood pressure stable.  He is not having any symptoms suggestive of angina.  We will plan on seeing him back in the office for pacemaker interrogation in 1 year if he develops any new problems of asked him to contact the office sooner.      Past Medical History:   Diagnosis Date    AK (actinic keratosis)     multiple    Arthritis     Bilateral carotid artery stenosis 04/30/2012    CAD (coronary artery disease)     Carotid artery stenosis     Coronary artery disease involving native coronary artery of native heart without angina pectoris 04/30/2012    PCI to LAD 7/2018 CABG 1997     Hyperlipemia     Hypertension     Mixed hyperlipidemia 08/27/2019    Non-ST elevation myocardial infarction (NSTEMI)     2015    Peripheral vascular disease     Pre-diabetes     Presence of cardiac pacemaker 08/28/2019    BS PM 3/2012    Pulmonary nodule 2016    left lung    Sick sinus syndrome 08/27/2019    SSS (sick sinus syndrome)     intermittent AV block       Past Surgical History:   Procedure Laterality Date    CARDIAC ELECTROPHYSIOLOGY PROCEDURE N/A 1/16/2025    Procedure: Biventricular PACEMAKER Upgrade, [place] - REPS EMAILED;  Surgeon: Cassie Agudelo MD;  Location: [facility] CATH INVASIVE LOCATION;  Service: Cardiovascular;  Laterality: N/A;    CAROTID ENDARTERECTOMY Right 10/2014    CORONARY ANGIOPLASTY  2012    svg to obtuse marginal branch of lcx    CORONARY ANGIOPLASTY WITH STENT PLACEMENT   07/29/2018    pci to mid lad    CORONARY ARTERY BYPASS GRAFT  1997    5 vessels at [facility]/ Dr Armendariz    CORONARY STENT PLACEMENT  03/16/2015    stenting of svg graft to obtuse marginal and peripheral descending artery    FEMORAL POPLITEAL BYPASS  11/2014    ILIAC ARTERY STENT  12/2014    INSERT / REPLACE / REMOVE PACEMAKER      PACEMAKER IMPLANTATION  03/29/2012    [facility]ia [employer]         Current Outpatient Medications:     acetaminophen (TYLENOL) 500 MG tablet, Take 1 tablet by mouth Every 6 (Six) Hours As Needed for Mild Pain ., Disp: 30 tablet, Rfl: 0    amLODIPine (NORVASC) 5 MG tablet, TAKE 1 TABLET BY MOUTH DAILY, Disp: 90 tablet, Rfl: 3    atenolol (TENORMIN) 25 MG tablet, TAKE 1 TABLET BY MOUTH DAILY, Disp: 90 tablet, Rfl: 3    atorvastatin (LIPITOR) 40 MG tablet, Take 1 tablet by mouth Daily., Disp: , Rfl:     clopidogrel (PLAVIX) 75 MG tablet, TAKE 1 TABLET BY MOUTH DAILY, Disp: 90 tablet, Rfl: 3    lisinopril (PRINIVIL,ZESTRIL) 40 MG tablet, Take 1 tablet by mouth Daily., Disp: 90 tablet, Rfl: 3    Social History     Socioeconomic History    Marital status:    Tobacco Use    Smoking status: Every Day     Current packs/day: 1.00     Average packs/day: 1 pack/day for 40.0 years (40.0 ttl pk-yrs)     Types: Cigarettes     Passive exposure: Current    Smokeless tobacco: Never   Vaping Use    Vaping status: Never Used   Substance and Sexual Activity    Alcohol use: No    Drug use: No    Sexual activity: Defer       Family History   Problem Relation Age of Onset    Heart disease Father        The following portions of the patient's history were reviewed and updated as appropriate: allergies, current medications, past family history, past medical history, past social history, past surgical history and problem list.    Review of Systems   All other systems reviewed and are negative.    Pertinent items are noted in HPI, all other systems reviewed and negative      /72 (BP Location: Right arm,  "Patient Position: Sitting, Cuff Size: Large Adult)   Pulse 64   Resp 16   Ht 172.7 cm (68\")   Wt 94.3 kg (208 lb)   SpO2 97%   BMI 31.63 kg/m² .    Objective     Vitals reviewed.   Constitutional:       General: Not in acute distress.     Appearance: Normal appearance. Well-developed.   Eyes:      Pupils: Pupils are equal, round, and reactive to light.   HENT:      Head: Normocephalic and atraumatic.   Neck:      Vascular: No JVD.   Pulmonary:      Effort: Pulmonary effort is normal.      Breath sounds: Normal breath sounds.   Cardiovascular:      Normal rate. Regular rhythm.   Edema:     Peripheral edema absent.   Abdominal:      General: There is no distension.      Palpations: Abdomen is soft.      Tenderness: There is no abdominal tenderness.   Musculoskeletal: Normal range of motion.      Cervical back: Normal range of motion and neck supple. Skin:     General: Skin is warm and dry.   Neurological:      Mental Status: Alert and oriented to person, place, and time.           EKG ordered and reviewed by me in office    ECG 12 Lead    Date/Time: 3/4/2025 11:24 AM  Performed by: Radha Calvo APRN    Authorized by: Radha Calvo APRN  Comparison: compared with previous ECG   Rhythm: paced  BPM: 64  Comments: Atrial paced rhythm with intact ventricular conduction, right bundle branch block              In Office Device Interrogation: Reviewed    DEVICE INTERROGATION:  IN OFFICE    DEVICE TYPE:   Dual-chamber pacemaker    :   [employer]    BATTERY:  Stable    TIME TO ELECTIVE REPLACEMENT INDICATORS:   15 years    CHARGE TIME:   Not applicable        LEAD DATA:   LEADS Reprogrammed for testing purposes    Atrial:   6.8 mV, 536 ohms, 0.6 V@0.4 ms    Ventricular:     23.3 mV, 554 ohms, 0.5 V@0.4 ms    LV:      Pacemaker Dependent: No      Atrial pacing percentage: 29%    Ventricular pacing percentage: 6%      Arrhythmia Logbook Reviewed: No A-fib        Summary:    Stable Device " Function    No significant arhythmia burden.     Battery status is stable.      NEXT IN OFFICE DEVICE CHECK DUE: 1 year    REMOTE DEVICE INTERROGATIONS: Ongoing

## 2025-03-04 NOTE — TELEPHONE ENCOUNTER
Patient was seen in office today and he stated that he is out of his lisinopril 40 mg he stated that he will need a 30 day supply sent into [facility] and then a 90 day sent to [facility]

## 2025-03-12 ENCOUNTER — HOSPITAL ENCOUNTER (OUTPATIENT)
Dept: INFUSION THERAPY | Facility: HOSPITAL | Age: 75
Discharge: HOME | End: 2025-03-12
Admitting: FAMILY MEDICINE
Payer: MEDICARE

## 2025-03-12 VITALS
DIASTOLIC BLOOD PRESSURE: 77 MMHG | TEMPERATURE: 98 F | SYSTOLIC BLOOD PRESSURE: 145 MMHG | OXYGEN SATURATION: 97 % | RESPIRATION RATE: 16 BRPM | HEART RATE: 71 BPM

## 2025-03-12 DIAGNOSIS — Z72.0 TOBACCO USE: Primary | ICD-10-CM

## 2025-03-12 DIAGNOSIS — D75.1 POLYCYTHEMIA: ICD-10-CM

## 2025-03-12 LAB
HCT VFR BLD AUTO: 52.7 % (ref 37.5–51)
HGB BLD-MCNC: 17.2 G/DL (ref 13–17.7)

## 2025-03-12 PROCEDURE — 99195 PHLEBOTOMY: CPT

## 2025-03-12 PROCEDURE — 85018 HEMOGLOBIN: CPT | Performed by: FAMILY MEDICINE

## 2025-03-12 PROCEDURE — 85014 HEMATOCRIT: CPT | Performed by: FAMILY MEDICINE

## 2025-03-12 RX ORDER — SODIUM CHLORIDE 9 MG/ML
250 INJECTION, SOLUTION INTRAVENOUS ONCE
Status: DISCONTINUED | OUTPATIENT
Start: 2025-03-12 | End: 2025-03-14 | Stop reason: HOSPADM

## 2025-03-12 RX ORDER — SODIUM CHLORIDE 9 MG/ML
250 INJECTION, SOLUTION INTRAVENOUS ONCE
OUTPATIENT
Start: 2025-03-12

## 2025-03-12 NOTE — CODE DOCUMENTATION
Lab specimens obtained via venipuncture with a butterfly from [facility] per protocol and without difficulty.  Patient tolerated well.   Subjective:       Patient ID: Danna Sorensen is a 78 y.o. female.    Chief Complaint: Medication Reaction    HPI  Review of Systems   Constitutional:  Negative for fatigue and unexpected weight change.   Respiratory:  Negative for chest tightness and shortness of breath.    Cardiovascular:  Negative for chest pain, palpitations and leg swelling.   Gastrointestinal:  Negative for abdominal pain.   Musculoskeletal:  Negative for arthralgias.   Neurological:  Negative for dizziness, syncope, light-headedness and headaches.     Patient Active Problem List   Diagnosis    Hypothyroid    Nuclear sclerosis    Hyperopia with astigmatism and presbyopia    DDD (degenerative disc disease), lumbar    Morbid obesity with BMI of 40.0-44.9, adult    Calcified granuloma of lung    History of colon cancer    Lumbar radiculitis    Other spondylosis, lumbar region    Chronic right-sided low back pain without sciatica    Vitamin D deficiency    Essential hypertension    Malignant neoplasm of ascending colon    Decreased functional mobility    Muscle tightness    Decreased strength    Decreased range of motion    Venous lake of lip     Patient is here for a chronic conditions follow up.    Seen by Dr. Romel MAY . Had side effects lyrica- dry mouth, swollen arms, stomach pain, nausea. Stopped it. Did not do well on gabapentin either. Rx for tramadol given    Reviewed labs 1/2023  mild normocytic anemia-slightly improved. Iron, folate and b12 normal 5/22  Dexa 5/22 osteoporosis on fosomax 5/22     ENT Dr. Tolentino venous lake of lip     F/u LBP. Started on lyrica 50mg 2 bid Tolerating well. Sleeps better. Pain in daytime 6/10. PT completed 2/22 not much help. Flexeril prn . Tramadol 50mg #60 lasts 2 months or more for low back pain-helps but still waking up from sleep with pain.  Tylenol also helps Sees chiropractor Dr. Stearns. Has gradually worsening of lower back pain. Ache that radiates to right hip. No paresthesias. No B/B incontinence.   Gabapentin- nausea.  Tried PT, KHARI and radiofrequency ablation without relief.       [facility] Dr. Berrios treating cataracts     Heme/onc Dr. Cali following for colon cancer s/p resection and chemo 2017     Had mri brast 2018- high risk.  3/22 mri breast negative. Cannot tolerate mammo.   Objective:      Physical Exam  Vitals and nursing note reviewed.   Constitutional:       Appearance: She is well-developed.   Cardiovascular:      Rate and Rhythm: Normal rate and regular rhythm.      Heart sounds: Normal heart sounds.   Pulmonary:      Effort: Pulmonary effort is normal.      Breath sounds: Normal breath sounds.   Skin:     General: Skin is warm and dry.   Neurological:      Mental Status: She is alert and oriented to person, place, and time.       Assessment:       1. Gastroesophageal reflux disease, unspecified whether esophagitis present    2. Chronic bilateral low back pain without sciatica    3. Spinal stenosis of lumbar region, unspecified whether neurogenic claudication present    4. Seasonal allergic rhinitis due to pollen        Plan:       1. Chronic bilateral low back pain without sciatica  Use tylenol prn and prn tramadol  - traMADoL (ULTRAM) 50 mg tablet; Take 1 tablet (50 mg total) by mouth every 8 (eight) hours as needed for Pain.  Dispense: 21 each; Refill: 0    2. Spinal stenosis of lumbar region, unspecified whether neurogenic claudication present  D/c lyrica and gabapentin  - traMADoL (ULTRAM) 50 mg tablet; Take 1 tablet (50 mg total) by mouth every 8 (eight) hours as needed for Pain.  Dispense: 21 each; Refill: 0    3. Gastroesophageal reflux disease, unspecified whether esophagitis present  Add x 3 months then trial off  - omeprazole (PRILOSEC) 40 MG capsule; Take 1 capsule (40 mg total) by mouth once daily.  Dispense: 90 capsule; Refill: PRN    4. Seasonal allergic rhinitis due to pollen  Add  - levocetirizine (XYZAL) 5 MG tablet; Take 1 tablet (5 mg total) by mouth nightly as needed for  Allergies (allergies).  Dispense: 90 tablet; Refill: PRN  - fluticasone propionate (FLONASE) 50 mcg/actuation nasal spray; 1 spray (50 mcg total) by Each Nostril route once daily.  Dispense: 16 g; Refill: PRN        Time spent with patient: 20 minutes    Patient with be reevaluated in 6 weeks or sooner prn    Greater than 50% of this visit was spent counseling as described in above documentation:Yes

## 2025-03-12 NOTE — PROGRESS NOTES
PHLEBOTOMY    Start Time: 1425    End Time: 1435    Hemaglobin Value: 17.2    Hematocrit Value: 52.7    Ferritin Value: n/a      Permit Signed: Self Signed    Prep with Chlorhexidine: yes    Needle Size: 16 Gauge    Number of Attempts: 1      Scale Calibration: yes    Amount Drawn: 500ml = 529grams    Phlebotomy Completed: yes    Phlebotomy Site: Left Antecubital    Dressing: Pressure Dressing Applied    Anchored: Held    Blood Discarded Per Protocol: yes    200+/- 5 grams: yes  500+/- 5 grams: yes    Self Care Assistance: Unassisted and Steady

## 2025-03-17 RX ORDER — LISINOPRIL 40 MG/1
40 TABLET ORAL DAILY
Qty: 90 TABLET | Refills: 1 | Status: SHIPPED | OUTPATIENT
Start: 2025-03-17

## 2025-03-17 NOTE — TELEPHONE ENCOUNTER
Caller: Aashish Freeman    Relationship: Self    Best call back number: [phone]    Requested Prescriptions:   Requested Prescriptions     Pending Prescriptions Disp Refills    lisinopril (PRINIVIL,ZESTRIL) 40 MG tablet 90 tablet 1     Sig: Take 1 tablet by mouth Daily.        Pharmacy where request should be sent: [address] [phone] [facility] [phone]      Last office visit with prescribing clinician: 10/29/2024   Last telemedicine visit with prescribing clinician: Visit date not found   Next office visit with prescribing clinician: 5/5/2025     Additional details provided by patient:  LOOKS LIKE IT WAS SENT ON 4TH BUT PHARMACY DID NOT RECEIVE IT. PATIENTS BEEN WITHOUT MEDICATION FOR TWO WEEKS     Does the patient have less than a 3 day supply:  [] Yes  [] No    Would you like a call back once the refill request has been completed: [] Yes [] No    If the office needs to give you a call back, can they leave a voicemail: [] Yes [] No    Morena Lainez Rep   03/17/25 12:42 EDT

## 2025-04-09 ENCOUNTER — HOSPITAL ENCOUNTER (OUTPATIENT)
Dept: INFUSION THERAPY | Facility: HOSPITAL | Age: 75
Discharge: HOME | End: 2025-04-09
Admitting: FAMILY MEDICINE
Payer: MEDICARE

## 2025-04-09 DIAGNOSIS — D75.1 POLYCYTHEMIA: ICD-10-CM

## 2025-04-09 DIAGNOSIS — Z72.0 TOBACCO USE: ICD-10-CM

## 2025-04-09 LAB
HCT VFR BLD AUTO: 49.3 % (ref 37.5–51)
HGB BLD-MCNC: 16.2 G/DL (ref 13–17.7)

## 2025-04-09 PROCEDURE — G0463 HOSPITAL OUTPT CLINIC VISIT: HCPCS

## 2025-04-09 PROCEDURE — 85014 HEMATOCRIT: CPT | Performed by: FAMILY MEDICINE

## 2025-04-09 PROCEDURE — 36415 COLL VENOUS BLD VENIPUNCTURE: CPT

## 2025-04-09 PROCEDURE — 85018 HEMOGLOBIN: CPT | Performed by: FAMILY MEDICINE

## 2025-04-14 RX ORDER — ATORVASTATIN CALCIUM 40 MG/1
40 TABLET, FILM COATED ORAL DAILY
Qty: 90 TABLET | Refills: 3 | Status: SHIPPED | OUTPATIENT
Start: 2025-04-14

## 2025-04-22 ENCOUNTER — OFFICE VISIT (OUTPATIENT)
Dept: FAMILY MEDICINE CLINIC | Facility: CLINIC | Age: 75
End: 2025-04-22
Payer: MEDICARE

## 2025-04-22 VITALS
HEIGHT: 68 IN | HEART RATE: 110 BPM | OXYGEN SATURATION: 93 % | RESPIRATION RATE: 20 BRPM | BODY MASS INDEX: 31.52 KG/M2 | SYSTOLIC BLOOD PRESSURE: 142 MMHG | DIASTOLIC BLOOD PRESSURE: 78 MMHG | WEIGHT: 208 LBS | TEMPERATURE: 99.8 F

## 2025-04-22 DIAGNOSIS — J06.9 VIRAL URI WITH COUGH: Primary | ICD-10-CM

## 2025-04-22 PROCEDURE — 1160F RVW MEDS BY RX/DR IN RCRD: CPT | Performed by: STUDENT IN AN ORGANIZED HEALTH CARE EDUCATION/TRAINING PROGRAM

## 2025-04-22 PROCEDURE — 3078F DIAST BP <80 MM HG: CPT | Performed by: STUDENT IN AN ORGANIZED HEALTH CARE EDUCATION/TRAINING PROGRAM

## 2025-04-22 PROCEDURE — 99213 OFFICE O/P EST LOW 20 MIN: CPT | Performed by: STUDENT IN AN ORGANIZED HEALTH CARE EDUCATION/TRAINING PROGRAM

## 2025-04-22 PROCEDURE — 1126F AMNT PAIN NOTED NONE PRSNT: CPT | Performed by: STUDENT IN AN ORGANIZED HEALTH CARE EDUCATION/TRAINING PROGRAM

## 2025-04-22 PROCEDURE — 3077F SYST BP >= 140 MM HG: CPT | Performed by: STUDENT IN AN ORGANIZED HEALTH CARE EDUCATION/TRAINING PROGRAM

## 2025-04-22 PROCEDURE — 1159F MED LIST DOCD IN RCRD: CPT | Performed by: STUDENT IN AN ORGANIZED HEALTH CARE EDUCATION/TRAINING PROGRAM

## 2025-04-22 RX ORDER — DEXTROMETHORPHAN HYDROBROMIDE AND PROMETHAZINE HYDROCHLORIDE 15; 6.25 MG/5ML; MG/5ML
5 SYRUP ORAL 4 TIMES DAILY PRN
Qty: 180 ML | Refills: 0 | Status: SHIPPED | OUTPATIENT
Start: 2025-04-22

## 2025-04-22 RX ORDER — BENZONATATE 100 MG/1
200 CAPSULE ORAL 3 TIMES DAILY PRN
Qty: 30 CAPSULE | Refills: 0 | Status: SHIPPED | OUTPATIENT
Start: 2025-04-22

## 2025-04-22 NOTE — PROGRESS NOTES
"Chief Complaint  Chief Complaint   Patient presents with    Cough     X 5 days    Sore Throat    URI     Runny nose started today       Subjective        Aashish Freeman is a 74 y.o. male who presents to [facility].  History of Present Illness    Aashish is a 74-year-old male here for cough.    Patient reports cough, sore throat, and bodyaches for the last 5 days.  Has had some nasal congestion and runny nose that started today.  Denies any fevers, chills.  Denies any sick contacts.  Denies history of lung disease.  Has taken over-the-counter cough syrup as well as some DayQuil and NyQuil without any significant relief.  Has been having problems sleeping at night due to cough.      Objective   /78   Pulse 110   Temp 99.8 °F (37.7 °C) (Oral)   Resp 20   Ht 172.7 cm (68\")   Wt 94.3 kg (208 lb)   SpO2 93%   BMI 31.63 kg/m²     Estimated body mass index is 31.63 kg/m² as calculated from the following:    Height as of this encounter: 172.7 cm (68\").    Weight as of this encounter: 94.3 kg (208 lb).     Physical Exam   GEN: In no acute distress, non toxic appearing  HEENT: EOMI. Mucous membranes moist. Oropharynx without erythema or exudate. TM normal in appearance bilaterally.   CV: Regular rate and rhythm, no murmurs. No extremity edema.   RESP: Occasional coarse rhonchi present with expiration; resolves with coughing. No wheezing, crackles bilaterally. No signs of respiratory distress on room air.  SKIN: No rashes  MSK: No joint erythema, deformity, or effusion.   NEURO: Alert and appropriate. CN 2-12 intact grossly.           Result Review :              Assessment and Plan     Diagnoses and all orders for this visit:    1. Viral URI with cough (Primary)  History and exam consistent with viral URI  No signs of bacterial infection on exam  Recommend Mucinex to help with congestion  Prescriptions for Tessalon Perles, promethazine-dextromethorphan sent to pharmacy to help with " cough  Counseled on typical time course for symptom improvement with viral URI.  Cough may persist for several weeks but should gradually improve.  If he develops any new/worsening symptoms and should return to the office for reevaluation.        Other orders  -     benzonatate (Tessalon Perles) 100 MG capsule; Take 2 capsules by mouth 3 (Three) Times a Day As Needed for Cough.  Dispense: 30 capsule; Refill: 0  -     promethazine-dextromethorphan (PROMETHAZINE-DM) 6.25-15 MG/5ML syrup; Take 5 mL by mouth 4 (Four) Times a Day As Needed for Cough.  Dispense: 180 mL; Refill: 0            Follow Up     No follow-ups on file.

## 2025-04-25 ENCOUNTER — TELEPHONE (OUTPATIENT)
Dept: FAMILY MEDICINE CLINIC | Facility: CLINIC | Age: 75
End: 2025-04-25

## 2025-04-25 NOTE — TELEPHONE ENCOUNTER
Caller: Aashish Freeman    Relationship: Self    Best call back number: [phone]    What medication are you requesting: SOMETHING FOR CONGESTION AND  COUGH,     What are your current symptoms: COUGH, CONGESTION    How long have you been experiencing symptoms: 8 DAYS    Have you had these symptoms before:    [x] Yes  [] No    Have you been treated for these symptoms before:   [x] Yes  [] No    If a prescription is needed, what is your preferred pharmacy and phone number: [id number] - [place], [place] - [address] [facility] [address] - [phone]  - [phone] FX     Additional notes:     MUCINEX AND TESSALON PERLES ARE NOT WORKING , HAS TAKEN 8 DOSES OF MUCINEX, NOTHING HAS BROKEN UP IN HIS UPPER CHEST AND SOME IN HIS SINUSES

## 2025-04-28 NOTE — TELEPHONE ENCOUNTER
HUB TO RELAY:  I CALLED PATIENT AND LVM FOR PATIENT TO CALL OUR OFFICE BACK FOR THIS MESSAGE FROM DR. PATEL.    Recommend honey and 1200mg Mucinex twice daily. Recommend appt if not improving

## 2025-04-29 ENCOUNTER — OFFICE VISIT (OUTPATIENT)
Dept: FAMILY MEDICINE CLINIC | Facility: CLINIC | Age: 75
End: 2025-04-29
Payer: MEDICARE

## 2025-04-29 VITALS
DIASTOLIC BLOOD PRESSURE: 72 MMHG | RESPIRATION RATE: 18 BRPM | HEART RATE: 55 BPM | OXYGEN SATURATION: 96 % | HEIGHT: 68 IN | WEIGHT: 202.4 LBS | SYSTOLIC BLOOD PRESSURE: 138 MMHG | BODY MASS INDEX: 30.68 KG/M2

## 2025-04-29 DIAGNOSIS — J40 BRONCHITIS: Primary | ICD-10-CM

## 2025-04-29 RX ORDER — METHYLPREDNISOLONE 4 MG/1
TABLET ORAL
Qty: 21 TABLET | Refills: 0 | Status: SHIPPED | OUTPATIENT
Start: 2025-04-29

## 2025-04-29 RX ORDER — ALBUTEROL SULFATE 90 UG/1
2 INHALANT RESPIRATORY (INHALATION) EVERY 4 HOURS PRN
Qty: 18 G | Refills: 1 | Status: SHIPPED | OUTPATIENT
Start: 2025-04-29

## 2025-04-29 NOTE — PROGRESS NOTES
"Chief Complaint  Cough (Patient has been coughing for 3 weeks , he can't get rid of cough even with medicine ) and Night Sweats  Subjective        Aashish Freeman presents to [facility]  History of Present Illness  Pt comes in today with c/o cough that started about 3 weeks ago.  Has had some nasal congestion as well.  States cough is non productive.  Cough is worse when lying down.  Has tried mucinex, tessalon perles, and cough syrup that was prescribed here at the office about 2 weeks ago.   No fever or chills.  Pt is a smoker.    Cough  Night Sweats  Symptoms include cough.      Review of Systems   Constitutional:  Positive for night sweats.   Respiratory:  Positive for cough.      Objective     Vital Signs:   /72   Pulse 55   Resp 18   Ht 172.7 cm (67.99\")   Wt 91.8 kg (202 lb 6.4 oz)   SpO2 96%   BMI 30.78 kg/m²       BP Readings from Last 3 Encounters:   04/29/25 138/72   04/22/25 142/78   03/12/25 145/77       Wt Readings from Last 3 Encounters:   04/29/25 91.8 kg (202 lb 6.4 oz)   04/22/25 94.3 kg (208 lb)   03/04/25 94.3 kg (208 lb)     Physical Exam  Constitutional:       Appearance: He is well-developed.   Eyes:      Pupils: Pupils are equal, round, and reactive to light.   Cardiovascular:      Rate and Rhythm: Normal rate and regular rhythm.   Pulmonary:      Effort: Pulmonary effort is normal.      Breath sounds: Wheezing present.   Neurological:      Mental Status: He is alert and oriented to person, place, and time.        Result Review :                 Assessment and Plan    Diagnoses and all orders for this visit:    1. Bronchitis (Primary)  -     amoxicillin-clavulanate (AUGMENTIN) 875-125 MG per tablet; Take 1 tablet by mouth Every 12 (Twelve) Hours for 7 days.  Dispense: 14 tablet; Refill: 0  -     methylPREDNISolone (MEDROL) 4 MG dose pack; Take as directed on package instructions.  Dispense: 21 tablet; Refill: 0  -     albuterol sulfate  (90 " Base) MCG/ACT inhaler; Inhale 2 puffs Every 4 (Four) Hours As Needed for Wheezing.  Dispense: 18 g; Refill: 1    Meds sent in   Mucinex otc  Smoking cessation discussed  During this office visit, we discussed the pertinent aspects of the visit and treatment recommendations. Pt verbalizes understanding. Follow up was discussed. Patient was given the opportunity to ask questions and discuss other concerns.         Follow Up   Return if symptoms worsen or fail to improve.  Patient was given instructions and counseling regarding his condition or for health maintenance advice. Please see specific information pulled into the AVS if appropriate.

## 2025-04-29 NOTE — TELEPHONE ENCOUNTER
Name: Aashish Freeman      Relationship: Self      Best Callback Number: [phone]      HUB PROVIDED THE RELAY MESSAGE FROM THE OFFICE      PATIENT: SCHEDULED PER NOTE    ADDITIONAL INFORMATION:

## 2025-05-14 ENCOUNTER — HOSPITAL ENCOUNTER (OUTPATIENT)
Dept: INFUSION THERAPY | Facility: HOSPITAL | Age: 75
Discharge: HOME | End: 2025-05-14
Admitting: FAMILY MEDICINE
Payer: MEDICARE

## 2025-05-14 VITALS — RESPIRATION RATE: 18 BRPM | HEART RATE: 60 BPM | DIASTOLIC BLOOD PRESSURE: 65 MMHG | SYSTOLIC BLOOD PRESSURE: 134 MMHG

## 2025-05-14 DIAGNOSIS — Z72.0 TOBACCO USE: Primary | ICD-10-CM

## 2025-05-14 DIAGNOSIS — D75.1 POLYCYTHEMIA: ICD-10-CM

## 2025-05-14 LAB
HCT VFR BLD AUTO: 52.9 % (ref 37.5–51)
HGB BLD-MCNC: 16 G/DL (ref 13–17.7)

## 2025-05-14 PROCEDURE — 85014 HEMATOCRIT: CPT | Performed by: FAMILY MEDICINE

## 2025-05-14 PROCEDURE — 99195 PHLEBOTOMY: CPT

## 2025-05-14 PROCEDURE — 85018 HEMOGLOBIN: CPT | Performed by: FAMILY MEDICINE

## 2025-05-14 PROCEDURE — 36415 COLL VENOUS BLD VENIPUNCTURE: CPT

## 2025-05-14 RX ORDER — SODIUM CHLORIDE 9 MG/ML
250 INJECTION, SOLUTION INTRAVENOUS ONCE
OUTPATIENT
Start: 2025-05-14

## 2025-05-14 RX ORDER — SODIUM CHLORIDE 9 MG/ML
250 INJECTION, SOLUTION INTRAVENOUS ONCE
Status: DISCONTINUED | OUTPATIENT
Start: 2025-05-14 | End: 2025-05-16 | Stop reason: HOSPADM

## 2025-05-19 NOTE — ADDENDUM NOTE
Encounter addended by: Mary Anne Wright RN on: 5/19/2025 11:21 AM   Actions taken: Clinical Note Signed

## 2025-06-09 ENCOUNTER — OFFICE VISIT (OUTPATIENT)
Dept: FAMILY MEDICINE CLINIC | Facility: CLINIC | Age: 75
End: 2025-06-09
Payer: MEDICARE

## 2025-06-09 VITALS
BODY MASS INDEX: 30.83 KG/M2 | HEART RATE: 66 BPM | RESPIRATION RATE: 16 BRPM | SYSTOLIC BLOOD PRESSURE: 142 MMHG | DIASTOLIC BLOOD PRESSURE: 68 MMHG | OXYGEN SATURATION: 96 % | WEIGHT: 203.4 LBS | HEIGHT: 68 IN

## 2025-06-09 DIAGNOSIS — D75.1 POLYCYTHEMIA: ICD-10-CM

## 2025-06-09 DIAGNOSIS — I10 ESSENTIAL HYPERTENSION: ICD-10-CM

## 2025-06-09 DIAGNOSIS — R73.03 PREDIABETES: ICD-10-CM

## 2025-06-09 DIAGNOSIS — R97.20 ELEVATED PSA: Primary | ICD-10-CM

## 2025-06-09 PROCEDURE — G2211 COMPLEX E/M VISIT ADD ON: HCPCS | Performed by: FAMILY MEDICINE

## 2025-06-09 PROCEDURE — 99214 OFFICE O/P EST MOD 30 MIN: CPT | Performed by: FAMILY MEDICINE

## 2025-06-09 PROCEDURE — 1126F AMNT PAIN NOTED NONE PRSNT: CPT | Performed by: FAMILY MEDICINE

## 2025-06-09 PROCEDURE — 3077F SYST BP >= 140 MM HG: CPT | Performed by: FAMILY MEDICINE

## 2025-06-09 PROCEDURE — 1160F RVW MEDS BY RX/DR IN RCRD: CPT | Performed by: FAMILY MEDICINE

## 2025-06-09 PROCEDURE — 1159F MED LIST DOCD IN RCRD: CPT | Performed by: FAMILY MEDICINE

## 2025-06-09 PROCEDURE — 3078F DIAST BP <80 MM HG: CPT | Performed by: FAMILY MEDICINE

## 2025-06-09 RX ORDER — LISINOPRIL 20 MG/1
20 TABLET ORAL DAILY
Qty: 90 TABLET | Refills: 1 | Status: SHIPPED | OUTPATIENT
Start: 2025-06-09

## 2025-06-09 NOTE — PROGRESS NOTES
Chief Complaint   Patient presents with    Elevated PSA     HPI  Aashish Freeman is a 74 y.o. male that presents for   Chief Complaint   Patient presents with    Elevated PSA     PSA: 2/2025 PSA up to 7.98 from 1.1 8 years prior. He reports 2 episodes of nocturia most nights. Also reports weak stream at night. Patient was referred to urology but never saw them    Prediabetes: last A1c 5.8. Playing golf 3x/week. No diet changes.    Polycythemia: hgb 18.3 and hct 55.1 noted on 2/2025 labs. [place] to be 2/2 smoking hx. He was referred for phlebotomy and has completed 2 phlebotomies due to this concern. Still smoking 1ppd. Not interested in quitting    HTN: 142/68 today. Maintained on amlodipine 5mg daily and atenolol 25mg daily. Prescribed lisinopril 40mg but has been taking this QOD as he doesn't feel he needs it.    Review of Systems  Pertinent positives of ROS documented in HPI    The following portions of the patient's history were reviewed and updated as appropriate: problem list, past medical history, past surgical history, allergies, current medication    Problem List Tab  Patient History Tab  Immunizations Tab  Medications Tab  Chart Review Tab  Care Everywhere Tab  Synopsis Tab    PE  Vitals:    06/09/25 1400   BP: 142/68   Pulse: 66   Resp: 16   SpO2: 96%     Body mass index is 30.93 kg/m².  General: Obese, NAD  Head: AT/NC  Eyes: EOMI, anicteric sclera  Resp: CTAB, SCR, BS equal  CV: RRR w/o m/r/g; 2+ pulses  GI: Soft, NT/ND, +BS  MSK: FROM, no deformity, no edema  Skin: Warm, dry, intact  Neuro: Alert and oriented. No focal deficits  Psych: Appropriate mood and affect    Imaging  No Images in the past 120 days found..    Assessment & Plan   Aashish Freeman is a 74 y.o. male that presents for   Chief Complaint   Patient presents with    Elevated PSA     Diagnoses and all orders for this visit:    1. Elevated PSA (Primary): recently up to 7.98. Never saw urology. Very low threshold to refer to urology if PSA higher  today  -     PSA DIAGNOSTIC    2. Prediabetes: last A1c 5.8  -     Hemoglobin A1c  -     Comprehensive Metabolic Panel  - Counseled regarding diet and exercise    3. Polycythemia: felt to be 2/2 COPD/smoking   - Cont phlebotomy PRN   - Counseled regarding smoking cessation    4. Essential hypertension: 142/68 today. Currently taking lisinopril 40mg QOD  -     Start lisinopril (PRINIVIL,ZESTRIL) 20 MG tablet; Take 1 tablet by mouth Daily.  Dispense: 90 tablet; Refill: 1  - D/C lisinopril 40mg QOD  - Cont home amlodipine 5mg daily and atenolol 25mg daily     Return in about 8 months (around 2/9/2026).

## 2025-06-11 ENCOUNTER — HOSPITAL ENCOUNTER (OUTPATIENT)
Dept: INFUSION THERAPY | Facility: HOSPITAL | Age: 75
Discharge: HOME | End: 2025-06-11
Admitting: FAMILY MEDICINE
Payer: MEDICARE

## 2025-06-11 DIAGNOSIS — Z72.0 TOBACCO USE: ICD-10-CM

## 2025-06-11 DIAGNOSIS — D75.1 POLYCYTHEMIA: ICD-10-CM

## 2025-06-11 LAB
HCT VFR BLD AUTO: 47.9 % (ref 37.5–51)
HGB BLD-MCNC: 15.7 G/DL (ref 13–17.7)

## 2025-06-11 PROCEDURE — 36592 COLLECT BLOOD FROM PICC: CPT

## 2025-06-11 PROCEDURE — 85014 HEMATOCRIT: CPT | Performed by: FAMILY MEDICINE

## 2025-06-11 PROCEDURE — 85018 HEMOGLOBIN: CPT | Performed by: FAMILY MEDICINE

## 2025-06-11 PROCEDURE — 36415 COLL VENOUS BLD VENIPUNCTURE: CPT

## 2025-07-09 ENCOUNTER — HOSPITAL ENCOUNTER (OUTPATIENT)
Dept: INFUSION THERAPY | Facility: HOSPITAL | Age: 75
Discharge: HOME | End: 2025-07-09
Admitting: FAMILY MEDICINE
Payer: MEDICARE

## 2025-07-09 VITALS
OXYGEN SATURATION: 97 % | RESPIRATION RATE: 16 BRPM | DIASTOLIC BLOOD PRESSURE: 85 MMHG | SYSTOLIC BLOOD PRESSURE: 148 MMHG | HEART RATE: 73 BPM

## 2025-07-09 DIAGNOSIS — Z72.0 TOBACCO USE: ICD-10-CM

## 2025-07-09 DIAGNOSIS — D75.1 POLYCYTHEMIA: ICD-10-CM

## 2025-07-09 LAB
HCT VFR BLD AUTO: 50.3 % (ref 37.5–51)
HGB BLD-MCNC: 16.4 G/DL (ref 13–17.7)

## 2025-07-09 PROCEDURE — 36415 COLL VENOUS BLD VENIPUNCTURE: CPT

## 2025-07-09 PROCEDURE — 85018 HEMOGLOBIN: CPT | Performed by: FAMILY MEDICINE

## 2025-07-09 PROCEDURE — G0463 HOSPITAL OUTPT CLINIC VISIT: HCPCS

## 2025-07-09 PROCEDURE — 85014 HEMATOCRIT: CPT | Performed by: FAMILY MEDICINE

## 2025-07-09 NOTE — PROGRESS NOTES
Pt here for labs and possible phlebotomy.  Hgb 16.4,  Hct 50.3.  Labs drawn via venipuncture. Pt did not need phlebotomy today.

## 2025-07-17 ENCOUNTER — TELEPHONE (OUTPATIENT)
Dept: CARDIOLOGY | Facility: CLINIC | Age: 75
End: 2025-07-17
Payer: MEDICARE

## 2025-07-17 PROCEDURE — 93294 REM INTERROG EVL PM/LDLS PM: CPT | Performed by: NURSE PRACTITIONER

## 2025-07-17 PROCEDURE — 93296 REM INTERROG EVL PM/IDS: CPT | Performed by: NURSE PRACTITIONER

## 2025-07-17 NOTE — TELEPHONE ENCOUNTER
Remote transmission for [employer] scientific pacemaker.    Normal Remote: With Events  1  Normal Device Function  Events or Alerts: 3  Battery: Battery is at 100%, 15.00 yrs  Sensing, impedance and thresholds reviewed  Programmed parameters reviewed  Presenting rhythm reviewed  Heart Rate Histograms reviewed  Additional Notes:  1 episode of SVT lasting 3 minutes. 1 very brief episode of A flutter. Lasting 2 seconds. 1 episode of NSVT lasting 40 beats, 18 seconds.    Created By: Johnathan Marshall 07/17/2025 04:08 PM  Non-sustained Ventricular Tachycardia  1  Stored EGMs are consistent with or suggestive of Non-sustained VT  Total episodes: 1  Additional Notes:  40 beats, 18 seconds.  Episode was on June 1st, 2025 at 14:44    Created By: Johnathan Marshall 07/17/2025 04:08 PM  Tachycardia: SVT  1  Stored EGMs are consistent with or suggestive of Supraventricular Tachycardia  AT burden: 1%  Total number of events: 1  Additional Notes:  3 min. episode    Created By: Johnathan Marshall 07/17/2025 04:08 PM  Atrial Flutter w/Controlled V Response  1  Stored EGMs are consistent with or suggestive of Atrial Flutter with Controlled Ventricular Response  AT/AF [place]: 1%  Total episodes:  Longest episode:  Additional Notes:  2 second episode.    Last ischemic workup was done in 2018.  Those results are not in epic.  No recent electrolyte labs.    Patient states he did not have any symptoms that he can rembember.  He says that he has been golfing a lot and wonders if it may have been the heat.

## 2025-07-18 LAB
MC_CV_MDC_IDC_RATE_1: 160
MC_CV_MDC_IDC_ZONE_ID: 1
MDC_IDC_MSMT_BATTERY_REMAINING_LONGEVITY: 180 MO
MDC_IDC_MSMT_BATTERY_REMAINING_PERCENTAGE: 100 %
MDC_IDC_MSMT_BATTERY_STATUS: NORMAL
MDC_IDC_MSMT_LEADCHNL_RA_DTM: NORMAL
MDC_IDC_MSMT_LEADCHNL_RA_IMPEDANCE_VALUE: 674
MDC_IDC_MSMT_LEADCHNL_RA_PACING_THRESHOLD_AMPLITUDE: 0.4
MDC_IDC_MSMT_LEADCHNL_RA_PACING_THRESHOLD_POLARITY: NORMAL
MDC_IDC_MSMT_LEADCHNL_RA_PACING_THRESHOLD_PULSEWIDTH: 0.4
MDC_IDC_MSMT_LEADCHNL_RA_SENSING_INTR_AMPL: 6.6
MDC_IDC_MSMT_LEADCHNL_RV_DTM: NORMAL
MDC_IDC_MSMT_LEADCHNL_RV_IMPEDANCE_VALUE: 597
MDC_IDC_MSMT_LEADCHNL_RV_PACING_THRESHOLD_AMPLITUDE: 0.4
MDC_IDC_MSMT_LEADCHNL_RV_PACING_THRESHOLD_POLARITY: NORMAL
MDC_IDC_MSMT_LEADCHNL_RV_PACING_THRESHOLD_PULSEWIDTH: 0.4
MDC_IDC_MSMT_LEADCHNL_RV_SENSING_INTR_AMPL: 23.5
MDC_IDC_PG_MFG: NORMAL
MDC_IDC_PG_MODEL: NORMAL
MDC_IDC_PG_SERIAL: NORMAL
MDC_IDC_PG_TYPE: NORMAL
MDC_IDC_SESS_DTM: NORMAL
MDC_IDC_SESS_TYPE: NORMAL
MDC_IDC_SET_BRADY_AT_MODE_SWITCH_RATE: 170
MDC_IDC_SET_BRADY_LOWRATE: 60
MDC_IDC_SET_BRADY_MAX_SENSOR_RATE: 130
MDC_IDC_SET_BRADY_MAX_TRACKING_RATE: 130
MDC_IDC_SET_BRADY_MODE: NORMAL
MDC_IDC_SET_BRADY_PAV_DELAY: 220
MDC_IDC_SET_BRADY_SAV_DELAY: 220
MDC_IDC_SET_LEADCHNL_RA_PACING_AMPLITUDE: 2.5
MDC_IDC_SET_LEADCHNL_RA_PACING_POLARITY: NORMAL
MDC_IDC_SET_LEADCHNL_RA_PACING_PULSEWIDTH: 0.4
MDC_IDC_SET_LEADCHNL_RA_SENSING_POLARITY: NORMAL
MDC_IDC_SET_LEADCHNL_RA_SENSING_SENSITIVITY: 0.25
MDC_IDC_SET_LEADCHNL_RV_PACING_AMPLITUDE: 2.5
MDC_IDC_SET_LEADCHNL_RV_PACING_POLARITY: NORMAL
MDC_IDC_SET_LEADCHNL_RV_PACING_PULSEWIDTH: 0.4
MDC_IDC_SET_LEADCHNL_RV_SENSING_POLARITY: NORMAL
MDC_IDC_SET_LEADCHNL_RV_SENSING_SENSITIVITY: 0.6
MDC_IDC_SET_ZONE_STATUS: NORMAL
MDC_IDC_SET_ZONE_TYPE: NORMAL
MDC_IDC_STAT_AT_BURDEN_PERCENT: 1
MDC_IDC_STAT_BRADY_RA_PERCENT_PACED: 17
MDC_IDC_STAT_BRADY_RV_PERCENT_PACED: 4

## 2025-07-25 ENCOUNTER — TELEPHONE (OUTPATIENT)
Dept: CARDIOLOGY | Facility: CLINIC | Age: 75
End: 2025-07-25
Payer: MEDICARE

## 2025-07-25 ENCOUNTER — TELEPHONE (OUTPATIENT)
Dept: CARDIOLOGY | Facility: CLINIC | Age: 75
End: 2025-07-25

## 2025-07-25 DIAGNOSIS — I49.01 VENTRICULAR FIBRILLATION: ICD-10-CM

## 2025-07-25 DIAGNOSIS — I47.20 VENTRICULAR TACHYCARDIA (PAROXYSMAL): Primary | ICD-10-CM

## 2025-07-25 NOTE — TELEPHONE ENCOUNTER
Dr. Curtis is recommending patient have a stress test done due to new episode of NSVT lasting 40 beats, 18 seconds.  Patient is agreeable to this.

## 2025-08-05 ENCOUNTER — HOSPITAL ENCOUNTER (OUTPATIENT)
Dept: NUCLEAR MEDICINE | Facility: HOSPITAL | Age: 75
Discharge: HOME | End: 2025-08-05
Payer: MEDICARE

## 2025-08-05 DIAGNOSIS — I47.20 VENTRICULAR TACHYCARDIA (PAROXYSMAL): ICD-10-CM

## 2025-08-05 DIAGNOSIS — I49.01 VENTRICULAR FIBRILLATION: ICD-10-CM

## 2025-08-05 LAB
BH CV NUCLEAR PRIOR STUDY: 2
BH CV REST NUCLEAR ISOTOPE DOSE: 11 MCI
BH CV STRESS BP STAGE 1: NORMAL
BH CV STRESS BP STAGE 2: NORMAL
BH CV STRESS BP STAGE 3: NORMAL
BH CV STRESS COMMENTS STAGE 1: NORMAL
BH CV STRESS DOSE REGADENOSON STAGE 1: 0.4
BH CV STRESS DURATION MIN STAGE 1: 1
BH CV STRESS DURATION MIN STAGE 2: 1
BH CV STRESS DURATION MIN STAGE 3: 1
BH CV STRESS DURATION SEC STAGE 1: 10
BH CV STRESS DURATION SEC STAGE 2: 0
BH CV STRESS HR STAGE 1: 77
BH CV STRESS HR STAGE 2: 79
BH CV STRESS HR STAGE 3: 75
BH CV STRESS NUCLEAR ISOTOPE DOSE: 32.1 MCI
BH CV STRESS PROTOCOL 1: NORMAL
BH CV STRESS RECOVERY BP: NORMAL MMHG
BH CV STRESS RECOVERY HR: 62 BPM
BH CV STRESS STAGE 1: 1
BH CV STRESS STAGE 2: 2
BH CV STRESS STAGE 3: 3
MAXIMAL PREDICTED HEART RATE: 144 BPM
PERCENT MAX PREDICTED HR: 54.86 %
SPECT HRT GATED+EF W RNC IV: 54 %
STRESS BASELINE BP: NORMAL MMHG
STRESS BASELINE HR: 61 BPM
STRESS PERCENT HR: 65 %
STRESS POST PEAK BP: NORMAL MMHG
STRESS POST PEAK HR: 79 BPM
STRESS TARGET HR: 122 BPM

## 2025-08-05 PROCEDURE — 93016 CV STRESS TEST SUPVJ ONLY: CPT | Performed by: NURSE PRACTITIONER

## 2025-08-05 PROCEDURE — 93018 CV STRESS TEST I&R ONLY: CPT | Performed by: INTERNAL MEDICINE

## 2025-08-05 PROCEDURE — 34310000005 TECHNETIUM TETROFOSMIN KIT: Performed by: INTERNAL MEDICINE

## 2025-08-05 PROCEDURE — 25010000002 REGADENOSON 0.4 MG/5ML SOLUTION: Performed by: INTERNAL MEDICINE

## 2025-08-05 PROCEDURE — 78452 HT MUSCLE IMAGE SPECT MULT: CPT

## 2025-08-05 PROCEDURE — A9502 TC99M TETROFOSMIN: HCPCS | Performed by: INTERNAL MEDICINE

## 2025-08-05 PROCEDURE — 93017 CV STRESS TEST TRACING ONLY: CPT

## 2025-08-05 PROCEDURE — 78452 HT MUSCLE IMAGE SPECT MULT: CPT | Performed by: INTERNAL MEDICINE

## 2025-08-05 RX ORDER — REGADENOSON 0.08 MG/ML
0.4 INJECTION, SOLUTION INTRAVENOUS
Status: COMPLETED | OUTPATIENT
Start: 2025-08-05 | End: 2025-08-05

## 2025-08-05 RX ADMIN — TETROFOSMIN 1 DOSE: 1.38 INJECTION, POWDER, LYOPHILIZED, FOR SOLUTION INTRAVENOUS at 10:30

## 2025-08-05 RX ADMIN — TETROFOSMIN 1 DOSE: 1.38 INJECTION, POWDER, LYOPHILIZED, FOR SOLUTION INTRAVENOUS at 09:04

## 2025-08-05 RX ADMIN — REGADENOSON 0.4 MG: 0.08 INJECTION, SOLUTION INTRAVENOUS at 10:30

## 2025-08-11 ENCOUNTER — TELEPHONE (OUTPATIENT)
Dept: CARDIOLOGY | Facility: CLINIC | Age: 75
End: 2025-08-11
Payer: MEDICARE

## 2025-08-11 DIAGNOSIS — I47.20 VENTRICULAR TACHYCARDIA (PAROXYSMAL): Primary | ICD-10-CM

## 2025-08-13 ENCOUNTER — HOSPITAL ENCOUNTER (OUTPATIENT)
Dept: INFUSION THERAPY | Facility: HOSPITAL | Age: 75
Discharge: HOME | End: 2025-08-13
Admitting: FAMILY MEDICINE
Payer: MEDICARE

## 2025-08-13 DIAGNOSIS — Z72.0 TOBACCO USE: ICD-10-CM

## 2025-08-13 DIAGNOSIS — D75.1 POLYCYTHEMIA: ICD-10-CM

## 2025-08-13 LAB
HCT VFR BLD AUTO: 48.3 % (ref 37.5–51)
HGB BLD-MCNC: 15.9 G/DL (ref 13–17.7)

## 2025-08-13 PROCEDURE — 36415 COLL VENOUS BLD VENIPUNCTURE: CPT

## 2025-08-13 PROCEDURE — 85014 HEMATOCRIT: CPT | Performed by: FAMILY MEDICINE

## 2025-08-13 PROCEDURE — 85018 HEMOGLOBIN: CPT | Performed by: FAMILY MEDICINE

## 2025-08-13 PROCEDURE — G0463 HOSPITAL OUTPT CLINIC VISIT: HCPCS

## (undated) DEVICE — IMMOB SHLDR CUT/AWAY UNIV

## (undated) DEVICE — ELECTRD DEFIB M/FUNC PROPADZ RADIOL 2PK

## (undated) DEVICE — RADIFOCUS GLIDEWIRE: Brand: GLIDEWIRE

## (undated) DEVICE — GUIDE WIRE WITH HYDROPHILIC COATING: Brand: ACUITY WHISPER VIEW™

## (undated) DEVICE — Device

## (undated) DEVICE — INTRO SHEATH PRELUDE SNAP .038 10F 13CM W/SDPRT FUSCHIA

## (undated) DEVICE — 3M™ STERI-STRIP™ BLEND TONE SKIN CLOSURES, B1557, TAN, 1/2 IN X 4 IN (12MM X 100MM), 6 STRIPS/ENVELOPE: Brand: 3M™ STERI-STRIP™

## (undated) DEVICE — PAD E/S GRND SGL/FOIL 9FT/CORD DISP

## (undated) DEVICE — GW XCHG AMPLTZ XSTIF PTFE CRV .035IN 3X180CM

## (undated) DEVICE — PLASMABLADE PS200-040 4.0: Brand: PLASMABLADE™

## (undated) DEVICE — VIOLET BRAIDED (POLYGLACTIN 910), SYNTHETIC ABSORBABLE SUTURE: Brand: COATED VICRYL

## (undated) DEVICE — ANTIBACTERIAL UNDYED BRAIDED (POLYGLACTIN 910), SYNTHETIC ABSORBABLE SUTURE: Brand: COATED VICRYL

## (undated) DEVICE — RADIFOCUS GLIDECATH: Brand: GLIDECATH

## (undated) DEVICE — INTRO SHEATH PRELUDE SNAP .038 6F 13CM W/SDPRT

## (undated) DEVICE — CABL BIPOL W/ALLGTR CLIP/SM 12FT

## (undated) DEVICE — SUT ETHIB 0/0 MO6 I8IN CX45D

## (undated) DEVICE — PK TRY HEART CATH 50

## (undated) DEVICE — GW WHOLEY HITORQ MOD/J .035 145CM

## (undated) DEVICE — ST ACC MICROPUNCTURE STFF/CANN PLAT/TP 4F 21G 40CM